# Patient Record
Sex: MALE | URBAN - METROPOLITAN AREA
[De-identification: names, ages, dates, MRNs, and addresses within clinical notes are randomized per-mention and may not be internally consistent; named-entity substitution may affect disease eponyms.]

---

## 2024-10-31 ENCOUNTER — TELEPHONE (OUTPATIENT)
Dept: INTENSIVE CARE | Facility: HOSPITAL | Age: 69
End: 2024-10-31

## 2024-10-31 ENCOUNTER — APPOINTMENT (OUTPATIENT)
Dept: CARDIOLOGY | Facility: HOSPITAL | Age: 69
End: 2024-10-31
Payer: OTHER GOVERNMENT

## 2024-10-31 ENCOUNTER — HOSPITAL ENCOUNTER (INPATIENT)
Facility: HOSPITAL | Age: 69
End: 2024-10-31
Attending: INTERNAL MEDICINE | Admitting: INTERNAL MEDICINE
Payer: OTHER GOVERNMENT

## 2024-10-31 DIAGNOSIS — G89.29 CHRONIC NECK AND BACK PAIN: ICD-10-CM

## 2024-10-31 DIAGNOSIS — I26.99 OTHER PULMONARY EMBOLISM WITHOUT ACUTE COR PULMONALE: ICD-10-CM

## 2024-10-31 DIAGNOSIS — I26.92 ACUTE SADDLE PULMONARY EMBOLISM, UNSPECIFIED WHETHER ACUTE COR PULMONALE PRESENT (MULTI): Primary | ICD-10-CM

## 2024-10-31 DIAGNOSIS — M54.9 CHRONIC NECK AND BACK PAIN: ICD-10-CM

## 2024-10-31 DIAGNOSIS — T83.511A URINARY TRACT INFECTION ASSOCIATED WITH INDWELLING URETHRAL CATHETER, INITIAL ENCOUNTER (CMS-HCC): ICD-10-CM

## 2024-10-31 DIAGNOSIS — E55.9 VITAMIN D DEFICIENCY: ICD-10-CM

## 2024-10-31 DIAGNOSIS — N39.0 URINARY TRACT INFECTION ASSOCIATED WITH INDWELLING URETHRAL CATHETER, INITIAL ENCOUNTER (CMS-HCC): ICD-10-CM

## 2024-10-31 DIAGNOSIS — M54.2 CHRONIC NECK AND BACK PAIN: ICD-10-CM

## 2024-10-31 LAB
ABO GROUP (TYPE) IN BLOOD: NORMAL
ALBUMIN SERPL BCP-MCNC: 3.3 G/DL (ref 3.4–5)
ANION GAP BLDV CALCULATED.4IONS-SCNC: 8 MMOL/L (ref 10–25)
ANION GAP SERPL CALC-SCNC: 12 MMOL/L (ref 10–20)
ANTIBODY SCREEN: NORMAL
APTT PPP: 27 SECONDS (ref 27–38)
BASE EXCESS BLDV CALC-SCNC: 1.3 MMOL/L (ref -2–3)
BASOPHILS # BLD AUTO: 0.05 X10*3/UL (ref 0–0.1)
BASOPHILS NFR BLD AUTO: 0.6 %
BNP SERPL-MCNC: 285 PG/ML (ref 0–99)
BODY TEMPERATURE: 37 DEGREES CELSIUS
BUN SERPL-MCNC: 16 MG/DL (ref 6–23)
CA-I BLDV-SCNC: 1.21 MMOL/L (ref 1.1–1.33)
CALCIUM SERPL-MCNC: 8.7 MG/DL (ref 8.6–10.6)
CHLORIDE BLDV-SCNC: 103 MMOL/L (ref 98–107)
CHLORIDE SERPL-SCNC: 102 MMOL/L (ref 98–107)
CO2 SERPL-SCNC: 26 MMOL/L (ref 21–32)
CREAT SERPL-MCNC: 1.12 MG/DL (ref 0.5–1.3)
EGFRCR SERPLBLD CKD-EPI 2021: 71 ML/MIN/1.73M*2
EOSINOPHIL # BLD AUTO: 0.47 X10*3/UL (ref 0–0.7)
EOSINOPHIL NFR BLD AUTO: 5.3 %
ERYTHROCYTE [DISTWIDTH] IN BLOOD BY AUTOMATED COUNT: 14.5 % (ref 11.5–14.5)
GLUCOSE BLDV-MCNC: 97 MG/DL (ref 74–99)
GLUCOSE SERPL-MCNC: 91 MG/DL (ref 74–99)
HCO3 BLDV-SCNC: 25.9 MMOL/L (ref 22–26)
HCT VFR BLD AUTO: 31.2 % (ref 41–52)
HCT VFR BLD EST: 29 % (ref 41–52)
HGB BLD-MCNC: 10.7 G/DL (ref 13.5–17.5)
HGB BLDV-MCNC: 9.8 G/DL (ref 13.5–17.5)
IMM GRANULOCYTES # BLD AUTO: 0.03 X10*3/UL (ref 0–0.7)
IMM GRANULOCYTES NFR BLD AUTO: 0.3 % (ref 0–0.9)
INHALED O2 CONCENTRATION: 21 %
INR PPP: 1.2 (ref 0.9–1.1)
LACTATE BLDV-SCNC: 1.2 MMOL/L (ref 0.4–2)
LACTATE SERPL-SCNC: 1.2 MMOL/L (ref 0.4–2)
LYMPHOCYTES # BLD AUTO: 2.11 X10*3/UL (ref 1.2–4.8)
LYMPHOCYTES NFR BLD AUTO: 23.6 %
MAGNESIUM SERPL-MCNC: 1.91 MG/DL (ref 1.6–2.4)
MCH RBC QN AUTO: 31 PG (ref 26–34)
MCHC RBC AUTO-ENTMCNC: 34.3 G/DL (ref 32–36)
MCV RBC AUTO: 90 FL (ref 80–100)
MONOCYTES # BLD AUTO: 0.65 X10*3/UL (ref 0.1–1)
MONOCYTES NFR BLD AUTO: 7.3 %
NEUTROPHILS # BLD AUTO: 5.63 X10*3/UL (ref 1.2–7.7)
NEUTROPHILS NFR BLD AUTO: 62.9 %
NRBC BLD-RTO: 0 /100 WBCS (ref 0–0)
OXYHGB MFR BLDV: 32.3 % (ref 45–75)
PCO2 BLDV: 40 MM HG (ref 41–51)
PH BLDV: 7.42 PH (ref 7.33–7.43)
PHOSPHATE SERPL-MCNC: 3.4 MG/DL (ref 2.5–4.9)
PLATELET # BLD AUTO: 275 X10*3/UL (ref 150–450)
PO2 BLDV: 24 MM HG (ref 35–45)
POTASSIUM BLDV-SCNC: 3.9 MMOL/L (ref 3.5–5.3)
POTASSIUM SERPL-SCNC: 4 MMOL/L (ref 3.5–5.3)
PROTHROMBIN TIME: 13.7 SECONDS (ref 9.8–12.8)
RBC # BLD AUTO: 3.45 X10*6/UL (ref 4.5–5.9)
RH FACTOR (ANTIGEN D): NORMAL
SAO2 % BLDV: 33 % (ref 45–75)
SODIUM BLDV-SCNC: 133 MMOL/L (ref 136–145)
SODIUM SERPL-SCNC: 136 MMOL/L (ref 136–145)
WBC # BLD AUTO: 8.9 X10*3/UL (ref 4.4–11.3)

## 2024-10-31 PROCEDURE — 51702 INSERT TEMP BLADDER CATH: CPT

## 2024-10-31 PROCEDURE — 83880 ASSAY OF NATRIURETIC PEPTIDE: CPT

## 2024-10-31 PROCEDURE — 94762 N-INVAS EAR/PLS OXIMTRY CONT: CPT

## 2024-10-31 PROCEDURE — 93010 ELECTROCARDIOGRAM REPORT: CPT | Performed by: INTERNAL MEDICINE

## 2024-10-31 PROCEDURE — 84132 ASSAY OF SERUM POTASSIUM: CPT

## 2024-10-31 PROCEDURE — 2500000002 HC RX 250 W HCPCS SELF ADMINISTERED DRUGS (ALT 637 FOR MEDICARE OP, ALT 636 FOR OP/ED)

## 2024-10-31 PROCEDURE — 84484 ASSAY OF TROPONIN QUANT: CPT

## 2024-10-31 PROCEDURE — 2020000001 HC ICU ROOM DAILY

## 2024-10-31 PROCEDURE — 85025 COMPLETE CBC W/AUTO DIFF WBC: CPT

## 2024-10-31 PROCEDURE — 2500000004 HC RX 250 GENERAL PHARMACY W/ HCPCS (ALT 636 FOR OP/ED)

## 2024-10-31 PROCEDURE — 93005 ELECTROCARDIOGRAM TRACING: CPT

## 2024-10-31 PROCEDURE — 85610 PROTHROMBIN TIME: CPT

## 2024-10-31 PROCEDURE — 83605 ASSAY OF LACTIC ACID: CPT

## 2024-10-31 PROCEDURE — 36415 COLL VENOUS BLD VENIPUNCTURE: CPT

## 2024-10-31 PROCEDURE — 83735 ASSAY OF MAGNESIUM: CPT

## 2024-10-31 PROCEDURE — 86850 RBC ANTIBODY SCREEN: CPT

## 2024-10-31 RX ORDER — METHOCARBAMOL 500 MG/1
1000 TABLET, FILM COATED ORAL 2 TIMES DAILY
COMMUNITY

## 2024-10-31 RX ORDER — OXYCODONE HYDROCHLORIDE 5 MG/1
5 CAPSULE ORAL EVERY 6 HOURS PRN
COMMUNITY

## 2024-10-31 RX ORDER — PANTOPRAZOLE SODIUM 40 MG/1
40 TABLET, DELAYED RELEASE ORAL
COMMUNITY

## 2024-10-31 RX ORDER — ASPIRIN 81 MG/1
81 TABLET ORAL DAILY
COMMUNITY

## 2024-10-31 RX ORDER — QUETIAPINE FUMARATE 25 MG/1
25 TABLET, FILM COATED ORAL ONCE
Status: COMPLETED | OUTPATIENT
Start: 2024-10-31 | End: 2024-10-31

## 2024-10-31 RX ORDER — SENNOSIDES 8.6 MG/1
1 TABLET ORAL DAILY
COMMUNITY

## 2024-10-31 RX ORDER — LAMOTRIGINE 25 MG/1
250 TABLET ORAL 2 TIMES DAILY
COMMUNITY

## 2024-10-31 RX ORDER — AMLODIPINE BESYLATE 10 MG/1
10 TABLET ORAL DAILY
COMMUNITY
End: 2024-11-08 | Stop reason: HOSPADM

## 2024-10-31 RX ORDER — BISACODYL 5 MG
10 TABLET, DELAYED RELEASE (ENTERIC COATED) ORAL NIGHTLY
COMMUNITY

## 2024-10-31 RX ORDER — FUROSEMIDE 20 MG/1
20 TABLET ORAL DAILY
COMMUNITY
End: 2024-11-08 | Stop reason: HOSPADM

## 2024-10-31 RX ORDER — HEPARIN SODIUM 10000 [USP'U]/100ML
INJECTION, SOLUTION INTRAVENOUS
Status: COMPLETED
Start: 2024-10-31 | End: 2024-10-31

## 2024-10-31 RX ORDER — ALLOPURINOL 300 MG/1
300 TABLET ORAL DAILY
COMMUNITY

## 2024-10-31 RX ORDER — GABAPENTIN 600 MG/1
600 TABLET ORAL 3 TIMES DAILY
COMMUNITY

## 2024-10-31 RX ORDER — CYCLOBENZAPRINE HCL 10 MG
10 TABLET ORAL 3 TIMES DAILY
COMMUNITY
End: 2024-11-08 | Stop reason: HOSPADM

## 2024-10-31 RX ORDER — LOSARTAN POTASSIUM 50 MG/1
50 TABLET ORAL DAILY
COMMUNITY
End: 2024-11-08 | Stop reason: HOSPADM

## 2024-10-31 RX ORDER — VENLAFAXINE HYDROCHLORIDE 150 MG/1
150 CAPSULE, EXTENDED RELEASE ORAL DAILY
COMMUNITY

## 2024-10-31 RX ORDER — ATORVASTATIN CALCIUM 40 MG/1
40 TABLET, FILM COATED ORAL DAILY
COMMUNITY

## 2024-10-31 RX ORDER — METFORMIN HYDROCHLORIDE 500 MG/1
750 TABLET ORAL
COMMUNITY

## 2024-10-31 RX ORDER — HEPARIN SODIUM 10000 [USP'U]/100ML
0-4500 INJECTION, SOLUTION INTRAVENOUS CONTINUOUS
Status: DISCONTINUED | OUTPATIENT
Start: 2024-10-31 | End: 2024-11-03

## 2024-10-31 RX ORDER — FENOFIBRATE 48 MG/1
48 TABLET, FILM COATED ORAL DAILY
COMMUNITY

## 2024-10-31 RX ORDER — MELOXICAM 15 MG/1
15 TABLET ORAL DAILY
COMMUNITY
End: 2024-11-08 | Stop reason: HOSPADM

## 2024-10-31 RX ORDER — LANOLIN ALCOHOL/MO/W.PET/CERES
1000 CREAM (GRAM) TOPICAL DAILY
COMMUNITY

## 2024-10-31 RX ORDER — LEVETIRACETAM 1000 MG/1
1250 TABLET ORAL 2 TIMES DAILY
COMMUNITY

## 2024-10-31 RX ORDER — DOCUSATE SODIUM 100 MG/1
100 CAPSULE, LIQUID FILLED ORAL
COMMUNITY

## 2024-10-31 RX ADMIN — HEPARIN SODIUM 2000 UNITS/HR: 10000 INJECTION, SOLUTION INTRAVENOUS at 21:02

## 2024-10-31 RX ADMIN — QUETIAPINE FUMARATE 25 MG: 25 TABLET ORAL at 22:50

## 2024-10-31 ASSESSMENT — PAIN - FUNCTIONAL ASSESSMENT: PAIN_FUNCTIONAL_ASSESSMENT: 0-10

## 2024-10-31 ASSESSMENT — COGNITIVE AND FUNCTIONAL STATUS - GENERAL
TURNING FROM BACK TO SIDE WHILE IN FLAT BAD: A LOT
MOVING TO AND FROM BED TO CHAIR: A LOT
WALKING IN HOSPITAL ROOM: A LOT
DAILY ACTIVITIY SCORE: 12
CLIMB 3 TO 5 STEPS WITH RAILING: A LOT
MOVING FROM LYING ON BACK TO SITTING ON SIDE OF FLAT BED WITH BEDRAILS: A LOT
HELP NEEDED FOR BATHING: A LOT
DRESSING REGULAR LOWER BODY CLOTHING: A LOT
DRESSING REGULAR UPPER BODY CLOTHING: A LOT
PERSONAL GROOMING: A LOT
EATING MEALS: A LOT
TOILETING: A LOT
MOBILITY SCORE: 12
STANDING UP FROM CHAIR USING ARMS: A LOT

## 2024-10-31 ASSESSMENT — PAIN SCALES - GENERAL: PAINLEVEL_OUTOF10: 0 - NO PAIN

## 2024-11-01 ENCOUNTER — APPOINTMENT (OUTPATIENT)
Dept: CARDIOLOGY | Facility: HOSPITAL | Age: 69
End: 2024-11-01
Payer: OTHER GOVERNMENT

## 2024-11-01 ENCOUNTER — APPOINTMENT (OUTPATIENT)
Dept: VASCULAR MEDICINE | Facility: HOSPITAL | Age: 69
End: 2024-11-01
Payer: OTHER GOVERNMENT

## 2024-11-01 LAB
25(OH)D3 SERPL-MCNC: 35 NG/ML (ref 30–100)
ALBUMIN SERPL BCP-MCNC: 3.2 G/DL (ref 3.4–5)
ALBUMIN SERPL BCP-MCNC: 3.4 G/DL (ref 3.4–5)
ALP SERPL-CCNC: 86 U/L (ref 33–136)
ALT SERPL W P-5'-P-CCNC: 9 U/L (ref 10–52)
ANION GAP SERPL CALC-SCNC: 15 MMOL/L (ref 10–20)
ANION GAP SERPL CALC-SCNC: 15 MMOL/L (ref 10–20)
AORTIC VALVE MEAN GRADIENT: 10 MMHG
AORTIC VALVE PEAK VELOCITY: 2.32 M/S
AST SERPL W P-5'-P-CCNC: 16 U/L (ref 9–39)
ATRIAL RATE: 98 BPM
AV PEAK GRADIENT: 22 MMHG
AVA (PEAK VEL): 1.46 CM2
AVA (VTI): 1.62 CM2
BASOPHILS # BLD AUTO: 0.04 X10*3/UL (ref 0–0.1)
BASOPHILS NFR BLD AUTO: 0.4 %
BILIRUB SERPL-MCNC: 0.5 MG/DL (ref 0–1.2)
BUN SERPL-MCNC: 13 MG/DL (ref 6–23)
BUN SERPL-MCNC: 16 MG/DL (ref 6–23)
CALCIUM SERPL-MCNC: 8.8 MG/DL (ref 8.6–10.6)
CALCIUM SERPL-MCNC: 9.1 MG/DL (ref 8.6–10.6)
CARDIAC TROPONIN I PNL SERPL HS: 113 NG/L (ref 0–53)
CHLORIDE SERPL-SCNC: 101 MMOL/L (ref 98–107)
CHLORIDE SERPL-SCNC: 102 MMOL/L (ref 98–107)
CO2 SERPL-SCNC: 23 MMOL/L (ref 21–32)
CO2 SERPL-SCNC: 24 MMOL/L (ref 21–32)
CREAT SERPL-MCNC: 1.1 MG/DL (ref 0.5–1.3)
CREAT SERPL-MCNC: 1.17 MG/DL (ref 0.5–1.3)
EGFRCR SERPLBLD CKD-EPI 2021: 67 ML/MIN/1.73M*2
EGFRCR SERPLBLD CKD-EPI 2021: 72 ML/MIN/1.73M*2
EJECTION FRACTION APICAL 4 CHAMBER: 52.8
EJECTION FRACTION: 55 %
EOSINOPHIL # BLD AUTO: 0.51 X10*3/UL (ref 0–0.7)
EOSINOPHIL NFR BLD AUTO: 5.5 %
ERYTHROCYTE [DISTWIDTH] IN BLOOD BY AUTOMATED COUNT: 14.6 % (ref 11.5–14.5)
GLUCOSE BLD MANUAL STRIP-MCNC: 100 MG/DL (ref 74–99)
GLUCOSE BLD MANUAL STRIP-MCNC: 104 MG/DL (ref 74–99)
GLUCOSE BLD MANUAL STRIP-MCNC: 113 MG/DL (ref 74–99)
GLUCOSE BLD MANUAL STRIP-MCNC: 137 MG/DL (ref 74–99)
GLUCOSE SERPL-MCNC: 84 MG/DL (ref 74–99)
GLUCOSE SERPL-MCNC: 99 MG/DL (ref 74–99)
HCT VFR BLD AUTO: 34.8 % (ref 41–52)
HGB BLD-MCNC: 11.5 G/DL (ref 13.5–17.5)
IMM GRANULOCYTES # BLD AUTO: 0.04 X10*3/UL (ref 0–0.7)
IMM GRANULOCYTES NFR BLD AUTO: 0.4 % (ref 0–0.9)
LEFT ATRIUM VOLUME AREA LENGTH INDEX BSA: 21.7 ML/M2
LEFT VENTRICLE INTERNAL DIMENSION DIASTOLE: 5.35 CM (ref 3.5–6)
LEFT VENTRICULAR OUTFLOW TRACT DIAMETER: 2.1 CM
LYMPHOCYTES # BLD AUTO: 2.65 X10*3/UL (ref 1.2–4.8)
LYMPHOCYTES NFR BLD AUTO: 28.5 %
MAGNESIUM SERPL-MCNC: 2 MG/DL (ref 1.6–2.4)
MCH RBC QN AUTO: 30.6 PG (ref 26–34)
MCHC RBC AUTO-ENTMCNC: 33 G/DL (ref 32–36)
MCV RBC AUTO: 93 FL (ref 80–100)
MITRAL VALVE E/A RATIO: 0.61
MONOCYTES # BLD AUTO: 0.66 X10*3/UL (ref 0.1–1)
MONOCYTES NFR BLD AUTO: 7.1 %
NEUTROPHILS # BLD AUTO: 5.39 X10*3/UL (ref 1.2–7.7)
NEUTROPHILS NFR BLD AUTO: 58.1 %
NRBC BLD-RTO: 0 /100 WBCS (ref 0–0)
P AXIS: 36 DEGREES
P OFFSET: 197 MS
P ONSET: 136 MS
PHOSPHATE SERPL-MCNC: 3.4 MG/DL (ref 2.5–4.9)
PHOSPHATE SERPL-MCNC: 4.1 MG/DL (ref 2.5–4.9)
PLATELET # BLD AUTO: 264 X10*3/UL (ref 150–450)
POTASSIUM SERPL-SCNC: 4.1 MMOL/L (ref 3.5–5.3)
POTASSIUM SERPL-SCNC: 4.2 MMOL/L (ref 3.5–5.3)
PR INTERVAL: 174 MS
PROT SERPL-MCNC: 6.5 G/DL (ref 6.4–8.2)
Q ONSET: 223 MS
QRS COUNT: 16 BEATS
QRS DURATION: 112 MS
QT INTERVAL: 368 MS
QTC CALCULATION(BAZETT): 469 MS
QTC FREDERICIA: 433 MS
R AXIS: 59 DEGREES
RBC # BLD AUTO: 3.76 X10*6/UL (ref 4.5–5.9)
RIGHT VENTRICLE FREE WALL PEAK S': 12.7 CM/S
RIGHT VENTRICLE PEAK SYSTOLIC PRESSURE: 45.5 MMHG
SODIUM SERPL-SCNC: 136 MMOL/L (ref 136–145)
SODIUM SERPL-SCNC: 136 MMOL/L (ref 136–145)
T AXIS: 89 DEGREES
T OFFSET: 407 MS
TRICUSPID ANNULAR PLANE SYSTOLIC EXCURSION: 2.2 CM
UFH PPP CHRO-ACNC: 0.2 IU/ML
UFH PPP CHRO-ACNC: 0.4 IU/ML
UFH PPP CHRO-ACNC: 0.4 IU/ML
VENTRICULAR RATE: 98 BPM
WBC # BLD AUTO: 9.3 X10*3/UL (ref 4.4–11.3)

## 2024-11-01 PROCEDURE — 99291 CRITICAL CARE FIRST HOUR: CPT

## 2024-11-01 PROCEDURE — 2500000005 HC RX 250 GENERAL PHARMACY W/O HCPCS

## 2024-11-01 PROCEDURE — 82947 ASSAY GLUCOSE BLOOD QUANT: CPT

## 2024-11-01 PROCEDURE — 1200000002 HC GENERAL ROOM WITH TELEMETRY DAILY

## 2024-11-01 PROCEDURE — 80069 RENAL FUNCTION PANEL: CPT | Mod: CCI

## 2024-11-01 PROCEDURE — 2500000004 HC RX 250 GENERAL PHARMACY W/ HCPCS (ALT 636 FOR OP/ED)

## 2024-11-01 PROCEDURE — 82306 VITAMIN D 25 HYDROXY: CPT

## 2024-11-01 PROCEDURE — 80053 COMPREHEN METABOLIC PANEL: CPT

## 2024-11-01 PROCEDURE — 93306 TTE W/DOPPLER COMPLETE: CPT

## 2024-11-01 PROCEDURE — 36415 COLL VENOUS BLD VENIPUNCTURE: CPT

## 2024-11-01 PROCEDURE — 2500000001 HC RX 250 WO HCPCS SELF ADMINISTERED DRUGS (ALT 637 FOR MEDICARE OP)

## 2024-11-01 PROCEDURE — 84100 ASSAY OF PHOSPHORUS: CPT

## 2024-11-01 PROCEDURE — 85025 COMPLETE CBC W/AUTO DIFF WBC: CPT

## 2024-11-01 PROCEDURE — 85520 HEPARIN ASSAY: CPT

## 2024-11-01 PROCEDURE — 2500000002 HC RX 250 W HCPCS SELF ADMINISTERED DRUGS (ALT 637 FOR MEDICARE OP, ALT 636 FOR OP/ED)

## 2024-11-01 PROCEDURE — 93306 TTE W/DOPPLER COMPLETE: CPT | Performed by: STUDENT IN AN ORGANIZED HEALTH CARE EDUCATION/TRAINING PROGRAM

## 2024-11-01 PROCEDURE — 83735 ASSAY OF MAGNESIUM: CPT

## 2024-11-01 RX ORDER — LANOLIN ALCOHOL/MO/W.PET/CERES
1000 CREAM (GRAM) TOPICAL DAILY
Status: DISCONTINUED | OUTPATIENT
Start: 2024-11-01 | End: 2024-11-08 | Stop reason: HOSPADM

## 2024-11-01 RX ORDER — CHOLECALCIFEROL (VITAMIN D3) 25 MCG
1000 TABLET ORAL DAILY
Status: DISCONTINUED | OUTPATIENT
Start: 2024-11-01 | End: 2024-11-08 | Stop reason: HOSPADM

## 2024-11-01 RX ORDER — SENNOSIDES 8.6 MG/1
2 TABLET ORAL 2 TIMES DAILY
Status: DISCONTINUED | OUTPATIENT
Start: 2024-11-01 | End: 2024-11-08 | Stop reason: HOSPADM

## 2024-11-01 RX ORDER — PANTOPRAZOLE SODIUM 40 MG/1
40 TABLET, DELAYED RELEASE ORAL
Status: DISCONTINUED | OUTPATIENT
Start: 2024-11-01 | End: 2024-11-08 | Stop reason: HOSPADM

## 2024-11-01 RX ORDER — ASPIRIN 81 MG/1
81 TABLET ORAL DAILY
Status: DISCONTINUED | OUTPATIENT
Start: 2024-11-01 | End: 2024-11-08 | Stop reason: HOSPADM

## 2024-11-01 RX ORDER — LEVETIRACETAM 500 MG/1
1250 TABLET ORAL 2 TIMES DAILY
Status: DISCONTINUED | OUTPATIENT
Start: 2024-11-01 | End: 2024-11-08 | Stop reason: HOSPADM

## 2024-11-01 RX ORDER — ATORVASTATIN CALCIUM 40 MG/1
40 TABLET, FILM COATED ORAL DAILY
Status: DISCONTINUED | OUTPATIENT
Start: 2024-11-01 | End: 2024-11-08 | Stop reason: HOSPADM

## 2024-11-01 RX ORDER — FENOFIBRATE 54 MG/1
54 TABLET ORAL DAILY
Status: DISCONTINUED | OUTPATIENT
Start: 2024-11-01 | End: 2024-11-08 | Stop reason: HOSPADM

## 2024-11-01 RX ORDER — AMLODIPINE BESYLATE 10 MG/1
10 TABLET ORAL DAILY
Status: DISCONTINUED | OUTPATIENT
Start: 2024-11-01 | End: 2024-11-02

## 2024-11-01 RX ORDER — CEFTRIAXONE 1 G/50ML
1 INJECTION, SOLUTION INTRAVENOUS EVERY 24 HOURS
Status: DISCONTINUED | OUTPATIENT
Start: 2024-11-01 | End: 2024-11-03

## 2024-11-01 RX ORDER — INSULIN LISPRO 100 [IU]/ML
0-5 INJECTION, SOLUTION INTRAVENOUS; SUBCUTANEOUS
Status: DISCONTINUED | OUTPATIENT
Start: 2024-11-01 | End: 2024-11-08 | Stop reason: HOSPADM

## 2024-11-01 RX ORDER — QUETIAPINE FUMARATE 25 MG/1
25 TABLET, FILM COATED ORAL ONCE
Status: COMPLETED | OUTPATIENT
Start: 2024-11-01 | End: 2024-11-01

## 2024-11-01 RX ORDER — BISACODYL 5 MG
10 TABLET, DELAYED RELEASE (ENTERIC COATED) ORAL
Status: DISCONTINUED | OUTPATIENT
Start: 2024-11-05 | End: 2024-11-08 | Stop reason: HOSPADM

## 2024-11-01 RX ORDER — LIDOCAINE 560 MG/1
3 PATCH PERCUTANEOUS; TOPICAL; TRANSDERMAL DAILY
Status: DISCONTINUED | OUTPATIENT
Start: 2024-11-01 | End: 2024-11-08 | Stop reason: HOSPADM

## 2024-11-01 RX ORDER — GABAPENTIN 600 MG/1
600 TABLET ORAL 3 TIMES DAILY
Status: DISCONTINUED | OUTPATIENT
Start: 2024-11-01 | End: 2024-11-01

## 2024-11-01 RX ORDER — DEXTROSE 50 % IN WATER (D50W) INTRAVENOUS SYRINGE
25
Status: DISCONTINUED | OUTPATIENT
Start: 2024-11-01 | End: 2024-11-08 | Stop reason: HOSPADM

## 2024-11-01 RX ORDER — DEXTROSE 50 % IN WATER (D50W) INTRAVENOUS SYRINGE
12.5
Status: DISCONTINUED | OUTPATIENT
Start: 2024-11-01 | End: 2024-11-08 | Stop reason: HOSPADM

## 2024-11-01 RX ADMIN — ATORVASTATIN CALCIUM 40 MG: 40 TABLET, FILM COATED ORAL at 10:01

## 2024-11-01 RX ADMIN — QUETIAPINE FUMARATE 25 MG: 25 TABLET ORAL at 03:16

## 2024-11-01 RX ADMIN — LAMOTRIGINE 250 MG: 100 TABLET ORAL at 03:16

## 2024-11-01 RX ADMIN — PERFLUTREN 3 ML OF DILUTION: 6.52 INJECTION, SUSPENSION INTRAVENOUS at 11:22

## 2024-11-01 RX ADMIN — Medication 1000 UNITS: at 16:51

## 2024-11-01 RX ADMIN — HEPARIN SODIUM 2200 UNITS/HR: 10000 INJECTION, SOLUTION INTRAVENOUS at 20:47

## 2024-11-01 RX ADMIN — STANDARDIZED SENNA CONCENTRATE 17.2 MG: 8.6 TABLET ORAL at 10:12

## 2024-11-01 RX ADMIN — AMLODIPINE BESYLATE 10 MG: 10 TABLET ORAL at 15:42

## 2024-11-01 RX ADMIN — FENOFIBRATE 54 MG: 54 TABLET, FILM COATED ORAL at 10:14

## 2024-11-01 RX ADMIN — PANTOPRAZOLE SODIUM 40 MG: 40 TABLET, DELAYED RELEASE ORAL at 10:20

## 2024-11-01 RX ADMIN — LEVETIRACETAM 1250 MG: 500 TABLET, FILM COATED ORAL at 01:51

## 2024-11-01 RX ADMIN — LAMOTRIGINE 250 MG: 100 TABLET ORAL at 20:47

## 2024-11-01 RX ADMIN — HEPARIN SODIUM 2200 UNITS/HR: 10000 INJECTION, SOLUTION INTRAVENOUS at 07:53

## 2024-11-01 RX ADMIN — LIDOCAINE 3 PATCH: 4 PATCH TOPICAL at 17:27

## 2024-11-01 RX ADMIN — STANDARDIZED SENNA CONCENTRATE 17.2 MG: 8.6 TABLET ORAL at 01:52

## 2024-11-01 RX ADMIN — LAMOTRIGINE 250 MG: 100 TABLET ORAL at 10:13

## 2024-11-01 RX ADMIN — LEVETIRACETAM 1250 MG: 500 TABLET, FILM COATED ORAL at 10:02

## 2024-11-01 RX ADMIN — ASPIRIN 81 MG: 81 TABLET, COATED ORAL at 10:01

## 2024-11-01 RX ADMIN — CEFTRIAXONE SODIUM 1 G: 1 INJECTION, SOLUTION INTRAVENOUS at 03:25

## 2024-11-01 RX ADMIN — CYANOCOBALAMIN TAB 1000 MCG 1000 MCG: 1000 TAB at 10:14

## 2024-11-01 RX ADMIN — LEVETIRACETAM 1250 MG: 500 TABLET, FILM COATED ORAL at 20:47

## 2024-11-01 RX ADMIN — GABAPENTIN 600 MG: 600 TABLET, FILM COATED ORAL at 10:01

## 2024-11-01 SDOH — ECONOMIC STABILITY: FOOD INSECURITY: WITHIN THE PAST 12 MONTHS, THE FOOD YOU BOUGHT JUST DIDN'T LAST AND YOU DIDN'T HAVE MONEY TO GET MORE.: NEVER TRUE

## 2024-11-01 SDOH — ECONOMIC STABILITY: FOOD INSECURITY: WITHIN THE PAST 12 MONTHS, YOU WORRIED THAT YOUR FOOD WOULD RUN OUT BEFORE YOU GOT THE MONEY TO BUY MORE.: NEVER TRUE

## 2024-11-01 SDOH — ECONOMIC STABILITY: INCOME INSECURITY: IN THE PAST 12 MONTHS HAS THE ELECTRIC, GAS, OIL, OR WATER COMPANY THREATENED TO SHUT OFF SERVICES IN YOUR HOME?: NO

## 2024-11-01 SDOH — ECONOMIC STABILITY: FOOD INSECURITY: HOW HARD IS IT FOR YOU TO PAY FOR THE VERY BASICS LIKE FOOD, HOUSING, MEDICAL CARE, AND HEATING?: NOT VERY HARD

## 2024-11-01 SDOH — SOCIAL STABILITY: SOCIAL INSECURITY: ARE THERE ANY APPARENT SIGNS OF INJURIES/BEHAVIORS THAT COULD BE RELATED TO ABUSE/NEGLECT?: NO

## 2024-11-01 SDOH — SOCIAL STABILITY: SOCIAL INSECURITY: WITHIN THE LAST YEAR, HAVE YOU BEEN AFRAID OF YOUR PARTNER OR EX-PARTNER?: NO

## 2024-11-01 SDOH — ECONOMIC STABILITY: HOUSING INSECURITY: IN THE PAST 12 MONTHS, HOW MANY TIMES HAVE YOU MOVED WHERE YOU WERE LIVING?: 0

## 2024-11-01 SDOH — SOCIAL STABILITY: SOCIAL INSECURITY
WITHIN THE LAST YEAR, HAVE YOU BEEN RAPED OR FORCED TO HAVE ANY KIND OF SEXUAL ACTIVITY BY YOUR PARTNER OR EX-PARTNER?: NO

## 2024-11-01 SDOH — SOCIAL STABILITY: SOCIAL INSECURITY
WITHIN THE LAST YEAR, HAVE YOU BEEN KICKED, HIT, SLAPPED, OR OTHERWISE PHYSICALLY HURT BY YOUR PARTNER OR EX-PARTNER?: NO

## 2024-11-01 SDOH — ECONOMIC STABILITY: HOUSING INSECURITY: IN THE LAST 12 MONTHS, WAS THERE A TIME WHEN YOU WERE NOT ABLE TO PAY THE MORTGAGE OR RENT ON TIME?: NO

## 2024-11-01 SDOH — SOCIAL STABILITY: SOCIAL INSECURITY: HAVE YOU HAD THOUGHTS OF HARMING ANYONE ELSE?: YES

## 2024-11-01 SDOH — ECONOMIC STABILITY: HOUSING INSECURITY: AT ANY TIME IN THE PAST 12 MONTHS, WERE YOU HOMELESS OR LIVING IN A SHELTER (INCLUDING NOW)?: NO

## 2024-11-01 SDOH — SOCIAL STABILITY: SOCIAL INSECURITY: HAS ANYONE EVER THREATENED TO HURT YOUR FAMILY OR YOUR PETS?: NO

## 2024-11-01 SDOH — SOCIAL STABILITY: SOCIAL INSECURITY: WITHIN THE LAST YEAR, HAVE YOU BEEN HUMILIATED OR EMOTIONALLY ABUSED IN OTHER WAYS BY YOUR PARTNER OR EX-PARTNER?: NO

## 2024-11-01 SDOH — SOCIAL STABILITY: SOCIAL INSECURITY: WERE YOU ABLE TO COMPLETE ALL THE BEHAVIORAL HEALTH SCREENINGS?: YES

## 2024-11-01 SDOH — ECONOMIC STABILITY: TRANSPORTATION INSECURITY: IN THE PAST 12 MONTHS, HAS LACK OF TRANSPORTATION KEPT YOU FROM MEDICAL APPOINTMENTS OR FROM GETTING MEDICATIONS?: NO

## 2024-11-01 SDOH — ECONOMIC STABILITY: HOUSING INSECURITY: IN THE PAST 12 MONTHS, HOW MANY TIMES HAVE YOU MOVED WHERE YOU WERE LIVING?: 1

## 2024-11-01 SDOH — SOCIAL STABILITY: SOCIAL INSECURITY: DO YOU FEEL UNSAFE GOING BACK TO THE PLACE WHERE YOU ARE LIVING?: NO

## 2024-11-01 SDOH — SOCIAL STABILITY: SOCIAL INSECURITY: ARE YOU OR HAVE YOU BEEN THREATENED OR ABUSED PHYSICALLY, EMOTIONALLY, OR SEXUALLY BY ANYONE?: NO

## 2024-11-01 SDOH — SOCIAL STABILITY: SOCIAL INSECURITY: DO YOU FEEL ANYONE HAS EXPLOITED OR TAKEN ADVANTAGE OF YOU FINANCIALLY OR OF YOUR PERSONAL PROPERTY?: NO

## 2024-11-01 SDOH — SOCIAL STABILITY: SOCIAL INSECURITY: HAVE YOU HAD ANY THOUGHTS OF HARMING ANYONE ELSE?: NO

## 2024-11-01 SDOH — SOCIAL STABILITY: SOCIAL INSECURITY: ABUSE: ADULT

## 2024-11-01 SDOH — ECONOMIC STABILITY: FOOD INSECURITY: HOW HARD IS IT FOR YOU TO PAY FOR THE VERY BASICS LIKE FOOD, HOUSING, MEDICAL CARE, AND HEATING?: NOT HARD AT ALL

## 2024-11-01 SDOH — SOCIAL STABILITY: SOCIAL INSECURITY: DOES ANYONE TRY TO KEEP YOU FROM HAVING/CONTACTING OTHER FRIENDS OR DOING THINGS OUTSIDE YOUR HOME?: NO

## 2024-11-01 ASSESSMENT — PAIN SCALES - GENERAL
PAINLEVEL_OUTOF10: 0 - NO PAIN
PAINLEVEL_OUTOF10: 6
PAINLEVEL_OUTOF10: 4

## 2024-11-01 ASSESSMENT — ACTIVITIES OF DAILY LIVING (ADL)
LACK_OF_TRANSPORTATION: NO
PATIENT'S MEMORY ADEQUATE TO SAFELY COMPLETE DAILY ACTIVITIES?: NO
ADEQUATE_TO_COMPLETE_ADL: YES
LACK_OF_TRANSPORTATION: NO
TOILETING: NEEDS ASSISTANCE
BATHING: NEEDS ASSISTANCE
GROOMING: INDEPENDENT
LACK_OF_TRANSPORTATION: NO
FEEDING YOURSELF: NEEDS ASSISTANCE
DRESSING YOURSELF: INDEPENDENT
HEARING - RIGHT EAR: FUNCTIONAL
WALKS IN HOME: NEEDS ASSISTANCE
LACK_OF_TRANSPORTATION: NO
HEARING - LEFT EAR: FUNCTIONAL
JUDGMENT_ADEQUATE_SAFELY_COMPLETE_DAILY_ACTIVITIES: YES

## 2024-11-01 ASSESSMENT — COLUMBIA-SUICIDE SEVERITY RATING SCALE - C-SSRS
6. HAVE YOU EVER DONE ANYTHING, STARTED TO DO ANYTHING, OR PREPARED TO DO ANYTHING TO END YOUR LIFE?: NO
1. IN THE PAST MONTH, HAVE YOU WISHED YOU WERE DEAD OR WISHED YOU COULD GO TO SLEEP AND NOT WAKE UP?: NO
2. HAVE YOU ACTUALLY HAD ANY THOUGHTS OF KILLING YOURSELF?: NO

## 2024-11-01 ASSESSMENT — LIFESTYLE VARIABLES
AUDIT-C TOTAL SCORE: 0
HOW MANY STANDARD DRINKS CONTAINING ALCOHOL DO YOU HAVE ON A TYPICAL DAY: PATIENT DOES NOT DRINK
AUDIT-C TOTAL SCORE: 0
PRESCIPTION_ABUSE_PAST_12_MONTHS: NO
SKIP TO QUESTIONS 9-10: 1
HOW OFTEN DO YOU HAVE 6 OR MORE DRINKS ON ONE OCCASION: NEVER
HOW OFTEN DO YOU HAVE A DRINK CONTAINING ALCOHOL: NEVER
SUBSTANCE_ABUSE_PAST_12_MONTHS: NO

## 2024-11-01 ASSESSMENT — PAIN SCALES - PAIN ASSESSMENT IN ADVANCED DEMENTIA (PAINAD): TOTALSCORE: MEDICATION (SEE MAR)

## 2024-11-01 ASSESSMENT — PAIN - FUNCTIONAL ASSESSMENT
PAIN_FUNCTIONAL_ASSESSMENT: 0-10

## 2024-11-01 ASSESSMENT — PAIN DESCRIPTION - ORIENTATION: ORIENTATION: LOWER

## 2024-11-01 ASSESSMENT — COGNITIVE AND FUNCTIONAL STATUS - GENERAL: PATIENT BASELINE BEDBOUND: YES

## 2024-11-01 ASSESSMENT — PAIN DESCRIPTION - LOCATION: LOCATION: BACK

## 2024-11-01 ASSESSMENT — PATIENT HEALTH QUESTIONNAIRE - PHQ9
2. FEELING DOWN, DEPRESSED OR HOPELESS: NOT AT ALL
1. LITTLE INTEREST OR PLEASURE IN DOING THINGS: NOT AT ALL
SUM OF ALL RESPONSES TO PHQ9 QUESTIONS 1 & 2: 0
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
## 2024-11-01 NOTE — H&P
"Cardiovascular Intensive Care - History and Physical   Subjective    Nino Crocker is a 69 y.o. year old male patient admitted on 10/31/2024 with following Cardiovascular ICU needs:  Saddle PE with concern for hemodynamic instability requiring close ICU monitoring    HPI:  Nino Crocker is a 69 y.o. year old male patient with PMHx significant for psoriatic arthritis (on infliximab), CVA (2024), HTN, seizure disorder, hx NSTEMI, and recent traumatic T6-T7 fracture (after a fall 2024) resulting in 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 bladder and bowel, presenting from [facility] ([place] with altered mental status.     History obtained from chart review and spouse. Wife stated that pt fell out of his wheelchair and hit his low back and head on 10/21, no LOC. Was sent to  ED, CTH negative. Since then, pt has become progressively more confused. Is hard of hearing but has been unable to comprehend simple conversations and unable to recall events of the recent past. She reports visual hallucinations. Patient denied recent f/c, SOB, CP, cough, n/v.  Did endorse occasional pleuritic CP in his left chest. Wife reports concern of pt not receiving all his medications at [facility]. Of note, pt was planned for admission to [facility] on  for elective colostomy and suprapubic catheter placement to treat neurogenic bowel/bladder.    [facility] - [facility] COURSE  ED course:   In ED, pt. was sleeping upon evaluation. He was AO x2 (oriented  to person/time, answered \"yes\" when asked if we were in a [employer]). Pt very poor historian but denied any acute complaints; no rhinorrhea, cough, n/v, chest pain, SOB. Denied pain anywhere. Had no sensation below umbilicus.    VS: Temp 98.2F, , Resp 18, /69, SpO2 96% RA     Labs  - CBC: WBC 10.9, Hb 11.6,    - BMP: Na 135, K 4.2, Cl 102, HCO3 21(L), BUN 18/Cr 1.5(H, bl. 0.8), Mg 1.8, Ca 9.1, Gluc 110  - LFTs: WNL  - TSH/fT4: WNL   - VB.51/29/36/LA 1.7   - UA " positive for: pH 5.5, 50 protein, 9 RBC, > 182 WBC, 500 LE, +nitrite, 4+ bacteria   - UDS: +cannabinoids   - Trop 141-->157-->128  -   - Lactate 1.7    EKG: sinus tachycardia (102), slightly wide QRS with RBBB pattern, new CHENCHO V1 (0.5mm) and new CHENCHO V2 (1mm)    Imaging:  CTH: negative for acute intracranial pathology (official report pending)  CXR: no acute CP process, no changes from prior 09/2024 (official report pending)  POCUS done by ED physician: preserved EF, dilated RV, +tSewart's sign, small/collapsible IVC    Interventions: 2L LR     Hospital Course:  Heparin gtt was started empirically after high suspicion for PE given bedside POCUS, elevated trop, BNP, right heart strain on EKG. CT PE showed saddle PE and large plot burden. Formal Echo obtained (pending read). Had remained hemodynamically stable on RA. Got 2L of NS in ED but otherwise has not needed pressors. Mentation still off . PESI score calculated was at 190 and would place him in intermediate high risk category. PERT call was done and patient was accepted to  CICU    On arrival to  CICU:  Patient disorented, spoke with wife and son at bedside. No active dyspnea. Still endorsing intermittent chest tenderness, pleuritic in nature.     PAST MEDICAL HISTORY  psoriatic arthritis (on infliximab), CVA (07/2024), HTN, seizure disorder, hx NSTEMI, and recent traumatic T6-T7 fracture (after  a fall 07/2024) resulting in 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 bladder and bowel     PAST SURGICAL HISTORY  Non-contributory     MEDICATIONS FROM FACILITY ([facility])  - Allopurinol 300 mg daily  - Lidocaine 4% patch apply on left chest, right chest, right lumbar area daily  - ASA 81 mg daily  - Atorvastatin 40 mg daily  - Bisacodyl suppository 10 mg qHS on TTS  - Cholecalciferol 1000 units daily  - Losartan 50 mg daily  - Cyclobenzaprine 10 mg TID  - Docusate 100 mg q24h PRN constipation  - Furosemide 20 mg daily  -  Levetiracetam 1250 mg BID  - Lactobacillus 1 tablet daily  - Lamotrigine 250 mg BID  - Meloxicam 15 mg daily  - Metformin 750 mg BID  - Methocarbamol 1000 mg BID  - Gabapentin 600 mg TID  - Amlodipine 10 mg daily (hold if SBP < 100)  - Oxycodone 5 mg q6h PRN moderate/severe pain  - Pantoprazole 40 mg daily  - Sennosides 8.6 mg daily  - Fenofibrate 48 mg daily  - Venlafaxine  mg daily  - Vitamin B12 1000 mcg daily  - Diclofenac 1% topical apply to anterior thorax, left shoulder, left hand, right hand q6h PRN pain    SOCIAL HISTORY  - Living situation: Lives at [facility] ([place])  - Tobacco: quit 1984  - Alcohol: denies  - Drugs: cannabis occasionally for pain      Meds    Home medications:  Current Outpatient Medications   Medication Instructions   • allopurinol (ZYLOPRIM) 300 mg, Daily   • amLODIPine (NORVASC) 10 mg, Daily   • aspirin 81 mg, Daily   • atorvastatin (LIPITOR) 40 mg, Daily   • bisacodyl (DULCOLAX) 10 mg, Nightly   • cyanocobalamin (VITAMIN B-12) 1,000 mcg, Daily   • cyclobenzaprine (FLEXERIL) 10 mg, 3 times daily   • docusate sodium (COLACE) 100 mg, Every 24 hours PRN   • fenofibrate (TRICOR) 48 mg, Daily   • furosemide (Lasix) 20 mg tablet Take by mouth.   • gabapentin (NEURONTIN) 600 mg, 3 times daily   • lamoTRIgine (LAMICTAL) 250 mg, 2 times daily   • levETIRAcetam (KEPPRA) 1,250 mg, 2 times daily   • losartan (COZAAR) 50 mg, Daily   • meloxicam (MOBIC) 15 mg, Daily   • metFORMIN (GLUCOPHAGE) 750 mg, 2 times daily (morning and late afternoon)   • methocarbamol (ROBAXIN) 1,000 mg, 2 times daily   • oxyCODONE (OXY-IR) 5 mg, Every 6 hours PRN   • pantoprazole (PROTONIX) 40 mg, Daily before breakfast   • sennosides (Senokot) 8.6 mg tablet 1 tablet, Daily   • venlafaxine XR (EFFEXOR-XR) 150 mg, Daily        Inpatient medications:  Scheduled medications  aspirin, 81 mg, oral, Daily  atorvastatin, 40 mg, oral, Daily  [START ON 11/5/2024] bisacodyl, 10 mg, oral, Every  Tues/Thur  cefTRIAXone, 1 g, intravenous, q24h  cyanocobalamin, 1,000 mcg, oral, Daily  fenofibrate, 54 mg, oral, Daily  gabapentin, 600 mg, oral, TID  insulin lispro, 0-5 Units, subcutaneous, TID  lamoTRIgine, 250 mg, oral, BID  levETIRAcetam, 1,250 mg, oral, BID  pantoprazole, 40 mg, oral, Daily before breakfast  sennosides, 2 tablet, oral, BID      Continuous medications  heparin, 0-4,500 Units/hr, Last Rate: 2,200 Units/hr (11/01/24 0300)      PRN medications  PRN medications: dextrose, dextrose, glucagon, glucagon, heparin     Objective    Blood pressure (!) 129/106, pulse 94, resp. rate 19, SpO2 90%.     Physical Exam  Constitutional:       General: He is not in acute distress.     Appearance: Normal appearance. He is normal weight.   HENT:      Head: Normocephalic and atraumatic.      Nose: Nose normal.   Eyes:      General: No scleral icterus.     Extraocular Movements: Extraocular movements intact.   Cardiovascular:      Rate and Rhythm: Normal rate and regular rhythm.      Heart sounds: Normal heart sounds. No murmur heard.     No friction rub. No gallop.   Pulmonary:      Effort: Pulmonary effort is normal. No respiratory distress.      Breath sounds: Normal breath sounds. No wheezing or rales.   Chest:      Chest wall: No tenderness.   Abdominal:      General: There is no distension.      Palpations: Abdomen is soft.      Tenderness: There is no abdominal tenderness.   Musculoskeletal:         General: Normal range of motion.      Cervical back: Normal range of motion and neck supple.      Right lower leg: No edema.      Left lower leg: No edema.   Skin:     General: Skin is warm and dry.   Neurological:      Mental Status: He is alert. Mental status is at baseline. He is disoriented.      Sensory: Sensory deficit present.      Motor: Weakness present.   Psychiatric:      Comments: Intermittently agitated          Images:    CT PE 10/31 at 1000: Extensive bilateral pulmonary embolism.  Requested images  be pushed to PACS     CXR: 10/31: No acute cardiopulmonary process or suspicious findings.      CTH 10/31: No acute or suspicious findings to account for patient's symptoms. Mild age consistent chronic changes.       EKG: S1Q3, flatted t3, sinus tachy, t wave inversions in V1-3, non specific St depressions 1 and AVL     Echo 10/31/2024  Left Ventricle:   Left ventricular size and systolic function is normal. The Ejection Fraction estimate is 60-65%. There is concentric remodeling of the left ventricle.     Right Ventricle:   The right ventricle is not well visualized. The right ventricle is grossly normal size. The right ventricular systolic function is moderately reduced. On subcostal view, there  appears to be significant hypokinesis of the RV free wall with apical sparing.     Left Atrium:   The left atrial size is normal.     Right Atrium:   The right atrium is borderline dilated.     Mitral Valve:   The mitral valve leaflets appear thickened, but open well. There is no mitral regurgitation noted.     Tricuspid Valve:   The tricuspid valve is grossly normal. There is mild to moderate tricuspid regurgitation. Right ventricular systolic pressure is'43'mm Hg. There is mild pulmonary hypertension.     Aortic Valve:   The aortic valve is trileaflet. The aortic valve appears mildly calcified. The Aortic Valve leaftlets appear mildly thickened. There is mild aortic stenosis. The aortic valve peak  velocity is 230 cm/s. The mean pressure gradient is 10mmHg. DI is 0.58. Mild aortic regurgitation.     Pulmonic Valve:   The pulmonic valve is not well visualized. Mild pulmonic valvular regurgitation.     Great Vessels:   The aortic root is normal size. The inferior vena cava appeared normal.     Pericardium/Pleural Effusions:   There is a Trivial pericardial effusion.     Diastology:   A variety of Doppler measurements indicate normal left ventricular diastolic function.     Interpretation Summary  Left ventricular size and  systolic function is normal.  The Ejection Fraction estimate is 60-65%  The right ventricle is grossly normal size.  The right ventricular systolic function is moderately reduced.  RV was only fairly well visualized on subcostal views and there appears to be significant hypokinesis of the RV free wall with apical sparing.  The right atrium is borderline dilated.  There is mild to moderate tricuspid regurgitation.  There is mild pulmonary hypertension  A variety of Doppler measurements indicate normal left ventricular diastolic function.    Echo 9/13/2024:   Left Ventricle:   The left ventricle is normal in size. Left ventricular systolic function is normal. The Ejection Fraction estimate is 60-65%. No regional wall motion abnormalities noted. Proximal  septal thickening is noted. There is concentric remodeling of the left ventricle.     Right Ventricle:   The right ventricle is normal in size and function.     Left Atrium:   The left atrial size is normal.     Right Atrium:   Right atrial size is normal.     Mitral Valve:   The mitral valve is grossly normal. No significant mitral valve stenosis. There is trace mitral regurgitation.     Tricuspid Valve:   The tricuspid valve is grossly normal. No significant tricuspid stenosis. There is trace tricuspid regurgitation. Right ventricular systolic pressure cannot be determined.     Aortic Valve:   The aortic valve is trileaflet. The aortic valve opens well. There is mild aortic stenosis. Mild aortic regurgitation.     Pulmonic Valve:   The pulmonic valve is not well seen, but is grossly normal. There is no pulmonic valvular stenosis. Trace pulmonic valvular regurgitation.     Great Vessels:   The aortic root is normal size. The inferior vena cava appeared normal.     Pericardium/Pleural Effusions:   There is a Trivial pericardial effusion.     Diastology:   A variety of Doppler measurements indicate normal left ventricular diastolic function.     Atrial Septum:   There  is no Doppler evidence for an atrial septal defect.     Interpretation Summary  The right ventricle is normal in size and function.  There is mild aortic stenosis  Mild aortic regurgitation.  A variety of Doppler measurements indicate normal left ventricular diastolic function.      Labs:     Latest Reference Range & Units 10/31/24 19:11 10/31/24 19:21 11/01/24 00:04 11/01/24 00:05   ANTIBODY SCREEN  NEG      ABO TYPE  A      Rh Type  POS      GLUCOSE 74 - 99 mg/dL 91  84    SODIUM 136 - 145 mmol/L 136  136    POTASSIUM 3.5 - 5.3 mmol/L 4.0  4.2    CHLORIDE 98 - 107 mmol/L 102  101    Bicarbonate 21 - 32 mmol/L 26  24    Anion Gap 10 - 20 mmol/L 12  15    Blood Urea Nitrogen 6 - 23 mg/dL 16  16    Creatinine 0.50 - 1.30 mg/dL 1.12  1.17    EGFR >60 mL/min/1.73m*2 71  67    Calcium 8.6 - 10.6 mg/dL 8.7  9.1    PHOSPHORUS 2.5 - 4.9 mg/dL 3.4   3.4   Albumin 3.4 - 5.0 g/dL 3.3 (L)  3.4    Alkaline Phosphatase 33 - 136 U/L   86    ALT 10 - 52 U/L   9 (L)    AST 9 - 39 U/L   16    Bilirubin Total 0.0 - 1.2 mg/dL   0.5    Total Protein 6.4 - 8.2 g/dL   6.5    MAGNESIUM 1.60 - 2.40 mg/dL 1.91   2.00   Lactate 0.4 - 2.0 mmol/L 1.2      BNP 0 - 99 pg/mL 285 (H)      Troponin I, High Sensitivity (CMC) 0 - 53 ng/L 113 (H)      INR 0.9 - 1.1  1.2 (H)      Heparin Unfractionated See Comment Below for Therapeutic Ranges IU/mL   0.2    Protime 9.8 - 12.8 seconds 13.7 (H)      aPTT 27 - 38 seconds 27      WBC 4.4 - 11.3 x10*3/uL 8.9  9.3    nRBC 0.0 - 0.0 /100 WBCs 0.0  0.0    RBC 4.50 - 5.90 x10*6/uL 3.45 (L)  3.76 (L)    HEMOGLOBIN 13.5 - 17.5 g/dL 10.7 (L)  11.5 (L)    HEMATOCRIT 41.0 - 52.0 % 31.2 (L)  34.8 (L)    MCV 80 - 100 fL 90  93    MCH 26.0 - 34.0 pg 31.0  30.6    MCHC 32.0 - 36.0 g/dL 34.3  33.0    RED CELL DISTRIBUTION WIDTH 11.5 - 14.5 % 14.5  14.6 (H)    Platelets 150 - 450 x10*3/uL 275  264    Neutrophils % 40.0 - 80.0 % 62.9  58.1    Immature Granulocytes %, Automated 0.0 - 0.9 % 0.3  0.4    Lymphocytes % 13.0 -  44.0 % 23.6  28.5    Monocytes % 2.0 - 10.0 % 7.3  7.1    Eosinophils % 0.0 - 6.0 % 5.3  5.5    Basophils % 0.0 - 2.0 % 0.6  0.4    Neutrophils Absolute 1.20 - 7.70 x10*3/uL 5.63  5.39    Immature Granulocytes Absolute, Automated 0.00 - 0.70 x10*3/uL 0.03  0.04    Lymphocytes Absolute 1.20 - 4.80 x10*3/uL 2.11  2.65    Monocytes Absolute 0.10 - 1.00 x10*3/uL 0.65  0.66    Eosinophils Absolute 0.00 - 0.70 x10*3/uL 0.47  0.51    Basophils Absolute 0.00 - 0.10 x10*3/uL 0.05  0.04    POCT pH, Venous 7.33 - 7.43 pH  7.42     POCT pCO2, Venous 41 - 51 mm Hg  40 (L)     POCT pO2, Venous 35 - 45 mm Hg  24 (L)     POCT SO2, Venous 45 - 75 %  33 (L)     POCT Oxy Hemoglobin, Venous 45.0 - 75.0 %  32.3 (L)     POCT Hematocrit Calculated, Venous 41.0 - 52.0 %  29.0 (L)     POCT Sodium, Venous 136 - 145 mmol/L  133 (L)     POCT Potassium, Venous 3.5 - 5.3 mmol/L  3.9     POCT Chloride, Venous 98 - 107 mmol/L  103     POCT Ionized Calicum, Venous 1.10 - 1.33 mmol/L  1.21     POCT Glucose, Venous 74 - 99 mg/dL  97     POCT Lactate, Venous 0.4 - 2.0 mmol/L  1.2     POCT Base Excess, Venous -2.0 - 3.0 mmol/L  1.3     POCT HCO3 Calculated, Venous 22.0 - 26.0 mmol/L  25.9     POCT Hemoglobin, Venous 13.5 - 17.5 g/dL  9.8 (L)     POCT Anion Gap, Venous 10.0 - 25.0 mmol/L  8.0 (L)     FiO2 %  21     Patient Temperature degrees Celsius  37.0     (L): Data is abnormally low  (H): Data is abnormally high    Assessment and Plan     Nino Crocker is a 69 y.o. year old male patient with PMHx significant for psoriatic arthritis (on infliximab), CVA (07/2024), HTN, seizure disorder, hx NSTEMI, and recent traumatic T6-T7 fracture (after a fall 07/2024) resulting in 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 bladder and bowel, [facility] course for patient was significant for  elevated trop, BNP, right heart strain on EKG. Patient hemodynamically stable on arrival  to CICU, breathing well on room air. Will obtain formal TTE in AM, request [facility] CT scan images for RV:LV ratio. Vasc med consult in AM.     NEUROLOGY/PSYCH:    #Altered Mental Status  :: Suspect 2/2 UTI primarily, also potential polypharmacy iso KORTNEY (gabapentin, oxycodone, methocarbamol, cyclobenzaprine)  :: prior UCx 8/27/24: Klebsiella (R: ampicillin, S: CTX)  :: UA [facility]: pH 5.5, 50 protein, 9 RBC, > 182 WBC, 500 LE, +nitrite, 4  + bacteria  :: Martinez exchanged 10/29 per family  - Continue 1 g CTX q24h (started at [facility] on 10/30/24) for 10 day course  (has allergy to 1st gen cephalosporin but no cross reactivity noted with 3rd  gen cephalosporins)  -hold cyclobenzaprine, methocarbamol, gabapentin, oxycodone  [ ] f/u BCX, UCx from OSH, will not repeat here  [ ] CTH obtained at OSH, request images   [ ] Delirium precautions, Seroquel PRN for agitation    #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 bladder and bowel,  - Hold home anti-spasmodic for now given AMS, consider re-initiation     #Seizure disorder  -c/w Keppra and lamotrigine    CARDIOVASCULAR:    #Pulmonary Embolism, Intermediate to High Risk  #Right heart strain  :: TTE 9/13/24: EF 60-65%, concentric remodeling of LV, normal RV size &  function, mild AS, mild AR  :: +Stewart's sign on OSH ED POCUS 10/31/24, R.BBB pattern on EKG  :: +BNP and troponin elevation  :: No tachycardia or hypotension noted in CICU, however these were reported at the [facility] (SBP in 90s, HR in 110s), now improved with fluids  :: FRANCISCO Stage II-III  [ ] Vasc Med consult   [ ] DVT US bilateral LE  [ ] Obtain images from OSH  [ ] Formal TTE in AM  [ ] Continue heparin gtt      #Hx NSTEMI  #Hx CVA  -c/w ASA, atorvastatin, fenofibrate    #HTN  -hold losartan, amlodipine, lasix given hypotension 90s/50s    PULMONARY:    # AHRF, resolved  - Wean oxygen as tolerated    RENAL/GENITOURINARY:    #KORTNEY, improved  :: Cr at OSH initially 1.5 from baseline 0.8, improved on CICU  admission  :: likely prerenal iso hypovolemia and poor PO intake vs use of nephrotoxic  medications  :: s/p 2L LR in ED at OSH  -hold meloxicam, furosemide, losartan  [ ] continue to trend, hold nephrotoxins      GASTROENTEROLOGY:    #Neurogenic Bowel/Bladder  -  Continue home bowel regimen    ENDOCRINOLOGY:    # T2DM  :: A1c 6.3% 08/2024  -hold metformin while inpt  -LDSSI  -q4 checks while NPO  -hypoglycemia protocol    RHEUM:    #Gout  -hold allopurinol iso KORTNEY    INFECTIOUS DISEASE:    #Urinary Tract Infection  - Continue CTX 1g q24h  - Follow OSH urine cultures      ICU Check List     FENA  Fluids: Cautious in setting of RHS  Electrolytes: K>4, Mg>2  Nutrition: Pending bedside swallow eval  Access: PIVs    Prophylaxis:  DVT ppx: heparin gtt  GI ppx: PPI  Bowel care: Bisacodyl Senna    Code: Full Code    HPOA: Spouse Leticia [phone]    11/01/24 at 4:16 AM      Disclaimer: Documentation completed with the information available at the time of input. The times in the chart may not be reflective of actual patient care times, interventions, or procedures. Documentation occurs after the physical care of the patient.

## 2024-11-01 NOTE — SIGNIFICANT EVENT
Updated plan of care from H&P earlier today    Vitals:    11/01/24 1300   BP: 165/73   Pulse: 92   Resp: 19   Temp:    SpO2: 100%   I/O last 3 completed shifts:  In: 237.3 (2 mL/kg) [P.O.:50; I.V.:187.3 (1.6 mL/kg)]  Out: 300 (2.6 mL/kg) [Urine:300 (0.1 mL/kg/hr)]  Weight: 117 kg   I/O this shift:  In: 232 [P.O.:100; I.V.:132]  Out: 1000 [Urine:1000]        Physical exam:  Constitutional/neuro: Sleepy but comfortable, alert and oriented x 1, follows commands.  Occasional jerking movements which may have been patient being startled.  Cardiac: No murmurs rubs or gallops  Pulmonary: decreased sounds bases.  On room air, no increased work of breathing  Abdomen: nt, nd,  Ext: 2+ lower extremity edema.      TTE  CONCLUSIONS:   1. Left ventricular ejection fraction is normal, calculated by Obando's biplane at 55%.   2. Spectral Doppler shows a normal pattern of left ventricular diastolic filling.   3. There is moderately reduced right ventricular systolic function.   4. Stewart's sign is present, suggestive of pulmonary embolism.   5. Moderately enlarged right ventricle.   6. Mildly elevated right ventricular systolic pressure.   7. Aortic valve sclerosis.    A/P   Nino Crocker is a 69 y.o. 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 initially with low normal BP 90s/60s, but on RA. found to have saddle PE with extensive bilateral PE, +Mcconnels sign. Transfer to [facility] after PERT call for PE (see telephone call in encounters for full note from PERT team call). On [facility] to [facility], HDS, no O2 requirement, , Troponin 141 but downtrending. PESI 160 high risk (elevation related to AMS which we  suspect is from UTI). Plan to continue heparin gtt, ctx for UTI (follow infectious workup in [facility]S). 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  He also has several antispasmodics, gabapentin, and oxycodone which he takes.  As per daughter-in-law, several days ago at nursing home, his medications may have all abruptly been resumed after a hiatus.  Currently holding antispasmodics and oxycodone, would slowly taper off gabapentin to prevent risk of seizures.  Would cautiously use medications for agitation.  Patient may have improved mental status with treatment for UTI.  Considering his seizures, it may not be unreasonable to consider on EEG possible neuro consult -no obvious jerking or shaking movements, but it is possible he is having subclinical seizures.      NEUROLOGY/PSYCH:     #Altered Mental Status  :: Suspect 2/2 UTI primarily, also potential polypharmacy iso KORTNEY (gabapentin, oxycodone, methocarbamol, cyclobenzaprine)  :: prior UCx 8/27/24: Klebsiella (R: ampicillin, S: CTX)  :: [facility]: pH 5.5, 50 protein, 9 RBC, > 182 WBC, 500 LE, +nitrite, 4  + bacteria  :: Martinez exchanged 10/29 per family  - Continue 1 g CTX q24h (started at [facility] on 10/30/24) for 10 day course  (has allergy to 1st gen cephalosporin but no cross reactivity noted with 3rd  gen cephalosporins)  -hold cyclobenzaprine, methocarbamol, gabapentin, oxycodone  [ ] f/u BCX, UCx from OSH, will not repeat here (from [facility])   [ ] CTH obtained at OSH, request images   [ ] Delirium precautions  [ ] would avoid Seroquel PRN for agitation  [ ] slowly decrease dose of gabapentin      #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 bladder and bowel,  - Hold home anti-spasmodic for now given AMS, consider re-initiation     #Seizure disorder  -c/w Keppra and  lamotrigine  -no clear seizure-like activity noted yet.   - [ ] If no improvement of mental status with medication adjustment and antibiotics, would consider EEG and neurology consult     CARDIOVASCULAR:     #Pulmonary Embolism, Intermediate to High Risk  #Right heart strain  :: TTE 9/13/24: EF 60-65%, concentric remodeling of LV, normal RV size &  function, mild AS, mild AR  :: +Stewart's sign on OSH ED POCUS 10/31/24, R.BBB pattern on EKG  :: +BNP and troponin elevation  :: No tachycardia or hypotension noted in CICU, however these were reported at the [facility] (SBP in 90s, HR in 110s), now improved with fluids  :: FRANCISCO Stage II-III  :: TTE at [facility] with preserved EF, mcconeels sign   [ ] follow DVT US bilateral LE  [ ] Obtain CTPE images from OSH  [ ] Continue heparin gtt  [ ] Consider vascular medicine consult for home-going anticoagulation       #Hx NSTEMI  #Hx CVA  -c/w ASA, atorvastatin, fenofibrate    #HTN  -hold losartan, amlodipine, lasix given hypotension 90s/50s     PULMONARY:     # AHRF, resolved  - Wean oxygen as tolerated     RENAL/GENITOURINARY:     #KORTNEY, improved  :: Cr at OSH initially 1.5 from baseline 0.8, improved on CICU admission  :: likely prerenal iso hypovolemia and poor PO intake vs use of nephrotoxic  medications  :: s/p 2L LR in ED at OSH  -hold meloxicam, furosemide, losartan  [ ] continue to trend, hold nephrotoxins       GASTROENTEROLOGY:     #Neurogenic Bowel/Bladder  -  Continue home bowel regimen     ENDOCRINOLOGY:     # T2DM  :: A1c 6.3% 08/2024  -hold metformin while inpt  -LDSSI  -q4 checks while NPO  -hypoglycemia protocol     RHEUM:    #Gout  -hold allopurinol iso KORTNEY     INFECTIOUS DISEASE:     #Urinary Tract Infection  - Continue CTX 1g q24h  - Follow OSH urine cultures        ICU Check List      FENA  Fluids: Cautious in setting of RHS  Electrolytes: K>4, Mg>2  Nutrition: Pending bedside swallow eval  Access: PIVs     Prophylaxis:  DVT ppx: heparin gtt  GI ppx: PPI  Bowel care:  Bisacodyl, Senna     Code: Full Code  - confirmed with family 10/31 and 11/1  As per daughter in law patient previously stated he wants to be full code  HPOA: Spouse Leticia [phone]
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
## 2024-11-01 NOTE — CONSULTS
"Nutrition Initial Assessment:   Nutrition Assessment    Reason for Assessment: Admission nursing screening    Patient is a 69 y.o. male presenting with a change in mental status.     Past medical history includes psoriatic arthritis (on infliximab), CVA (07/2024), HTN, seizure disorder, hx NSTEMI, and recent traumatic T6-T7 fracture (after a fall 07/2024) resulting in 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 bladder and bowel     Nutrition History:  Food and Nutrient History: Met with patient's daughter-in-law.  She reports that pt had a good appetite and intake PTA but that he may have been getting dehydrated at the facility he was at the last 3 weeks.  Reports that they were working with dietitian at facility to get his diet upgraded to Regular from mechanical soft. Pt's wife was bringing him food from the outside like a burger from [employer]. Pt has no teeth and has a slur that is sometimes misconstrued as trouble swallowing. She says that he is NPO now d/t his mental status as she says that he is trying to come out of \"gabapentin toxicity\".  When asked about his 10kg weight loss, she suspects this is accurate as she does not feel that he was gettng good care at his most recent facility.  No GI issues to report.  She is hoping to have Ensure or Boost sent on his meal trays when he is allowed to eat. RDN asked about Ensure High Protein on his orders from mid-August and she said she did not see him get this or drink this.  Also mentions Magic Cup as a possible supplement for him. Says that he was supposed to get a colostomy today       Anthropometrics:  Height: 190.5 cm (6' 3\")   Weight: 117 kg (257 lb 15 oz)   BMI (Calculated): 32.24  IBW/kg (Dietitian Calculated): 89 kg  Percent of IBW: 131 %     Weight History:     11/1/24: 117kg  8/14/24: 126.6kg  7/31/24: 128.6kg  7/12/24: 123.7kg    Pt is down 8% in weight in less than 3 months-- significant loss.     Weight Change %:       Nutrition Focused Physical " "Exam Findings:    Subcutaneous Fat Loss:   Orbital Fat Pads: Well nourished (slightly bulging fat pads)  Buccal Fat Pads: Mild-Moderate (flat cheeks, minimal bounce)  Triceps: Well nourished (ample fat tissue)  Muscle Wasting:  Temporalis: Well nourished (well-defined muscle)  Pectoralis (Clavicular Region): Well nourished (clavicle not visible)  Deltoid/Trapezius: Well nourished (rounded appearance at arm, shoulder, neck)  Quadriceps: Well nourished (well developed, well rounded)  Gastrocnemius: Well nourished (well developed bulbous muscle)  Edema:  Edema: +2 mild  Edema Location: generalized  Physical Findings:  Skin: Positive (PI back)    Nutrition Significant Labs:  A1C:  Lab Results   Component Value Date    HGBA1C 7.1 (H) 07/11/2024   , BG POCT trend:   Results from last 7 days   Lab Units 11/01/24  1122 11/01/24  0843   POCT GLUCOSE mg/dL 113* 104*    , Renal Lab Trend:   Results from last 7 days   Lab Units 11/01/24  0005 11/01/24  0004 10/31/24  1911   POTASSIUM mmol/L  --  4.2 4.0   PHOSPHORUS mg/dL 3.4  --  3.4   SODIUM mmol/L  --  136 136   MAGNESIUM mg/dL 2.00  --  1.91   EGFR mL/min/1.73m*2  --  67 71   BUN mg/dL  --  16 16   CREATININE mg/dL  --  1.17 1.12    , Vit D: No results found for: \"VITD25\"     Nutrition Specific Medications:  Scheduled medications  amLODIPine, 10 mg, oral, Daily  aspirin, 81 mg, oral, Daily  atorvastatin, 40 mg, oral, Daily  [START ON 11/5/2024] bisacodyl, 10 mg, oral, Every Tues/Thur  cefTRIAXone, 1 g, intravenous, q24h  cyanocobalamin, 1,000 mcg, oral, Daily  fenofibrate, 54 mg, oral, Daily  [Held by provider] gabapentin, 600 mg, oral, TID  insulin lispro, 0-5 Units, subcutaneous, TID  lamoTRIgine, 250 mg, oral, BID  levETIRAcetam, 1,250 mg, oral, BID  pantoprazole, 40 mg, oral, Daily before breakfast  sennosides, 2 tablet, oral, BID      Continuous medications  heparin, 0-4,500 Units/hr, Last Rate: 2,200 Units/hr (11/01/24 6024)      PRN medications  PRN medications: " dextrose, dextrose, glucagon, glucagon, heparin     I/O:   Last BM Date: 10/31/24; Stool Appearance: Formed (10/31/24 2300)        Dietary Orders (From admission, onward)       Start     Ordered    11/01/24 0730  May Participate in Room Service  ( ROOM SERVICE MAY PARTICIPATE)  Once        Question:  .  Answer:  Yes    11/01/24 0729    10/31/24 2025  NPO Diet; Effective now  Diet effective now         10/31/24 2025                     Estimated Needs:   Total Energy Estimated Needs (kCal):  (2400+)  Method for Estimating Needs: MSJ= 2020  Total Protein Estimated Needs (g): 135 g  Method for Estimating Needs: 1.5 x 89kg            Nutrition Diagnosis        Nutrition Diagnosis  Patient has Nutrition Diagnosis: Yes  Diagnosis Status (1): New  Nutrition Diagnosis 1: Increased nutrient needs  Related to (1): back wound  As Evidenced by (1): altered metabolism in the setting of wound healing     Difficult to determine overall nutrition status in patient.  Family reports good intake PTA.  Has weight loss but this may be anticipated in light of broken pack/paraplegia in July.  Will continue to follow.    Nutrition Interventions/Recommendations         Nutrition Prescription:   Once more awake can have RN do bedside swallow screen vs consulting SLP for formal eval in light of previous need for altered consistency diet.  If pt's mental status does not improve in the next few days, may need to consider a dobhoff for enteral access for hydration and nutrition as well as meds.   Reconsult this service if this is the plan.  Check Vitamin D level.          Nutrition Interventions:    RDN can order Magic Cup BID= 290kcasl, 9 grams protein each and Ensure Plus BID= 350kcals 13grams protein each       Nutrition Education:   Not appropriate       Nutrition Monitoring and Evaluation   Food/Nutrient Related History Monitoring  Monitoring and Evaluation Plan:  (ability for patient to take a PO diet vs need for temporary  dobhoff/TF)         Time Spent (min): 60 minutes

## 2024-11-02 LAB
ALBUMIN SERPL BCP-MCNC: 3.4 G/DL (ref 3.4–5)
ALP SERPL-CCNC: 82 U/L (ref 33–136)
ALT SERPL W P-5'-P-CCNC: 4 U/L (ref 10–52)
ANION GAP SERPL CALC-SCNC: 16 MMOL/L (ref 10–20)
AST SERPL W P-5'-P-CCNC: 11 U/L (ref 9–39)
BASOPHILS # BLD AUTO: 0.07 X10*3/UL (ref 0–0.1)
BASOPHILS NFR BLD AUTO: 0.8 %
BILIRUB SERPL-MCNC: 0.4 MG/DL (ref 0–1.2)
BUN SERPL-MCNC: 13 MG/DL (ref 6–23)
CALCIUM SERPL-MCNC: 9.2 MG/DL (ref 8.6–10.6)
CHLORIDE SERPL-SCNC: 104 MMOL/L (ref 98–107)
CO2 SERPL-SCNC: 22 MMOL/L (ref 21–32)
CREAT SERPL-MCNC: 1.14 MG/DL (ref 0.5–1.3)
EGFRCR SERPLBLD CKD-EPI 2021: 69 ML/MIN/1.73M*2
EOSINOPHIL # BLD AUTO: 0.5 X10*3/UL (ref 0–0.7)
EOSINOPHIL NFR BLD AUTO: 5.9 %
ERYTHROCYTE [DISTWIDTH] IN BLOOD BY AUTOMATED COUNT: 14.6 % (ref 11.5–14.5)
GLUCOSE BLD MANUAL STRIP-MCNC: 123 MG/DL (ref 74–99)
GLUCOSE BLD MANUAL STRIP-MCNC: 131 MG/DL (ref 74–99)
GLUCOSE BLD MANUAL STRIP-MCNC: 131 MG/DL (ref 74–99)
GLUCOSE BLD MANUAL STRIP-MCNC: 156 MG/DL (ref 74–99)
GLUCOSE SERPL-MCNC: 115 MG/DL (ref 74–99)
HCT VFR BLD AUTO: 33.8 % (ref 41–52)
HGB BLD-MCNC: 11 G/DL (ref 13.5–17.5)
IMM GRANULOCYTES # BLD AUTO: 0.04 X10*3/UL (ref 0–0.7)
IMM GRANULOCYTES NFR BLD AUTO: 0.5 % (ref 0–0.9)
LYMPHOCYTES # BLD AUTO: 2.7 X10*3/UL (ref 1.2–4.8)
LYMPHOCYTES NFR BLD AUTO: 31.8 %
MAGNESIUM SERPL-MCNC: 2.25 MG/DL (ref 1.6–2.4)
MCH RBC QN AUTO: 30.5 PG (ref 26–34)
MCHC RBC AUTO-ENTMCNC: 32.5 G/DL (ref 32–36)
MCV RBC AUTO: 94 FL (ref 80–100)
MONOCYTES # BLD AUTO: 0.75 X10*3/UL (ref 0.1–1)
MONOCYTES NFR BLD AUTO: 8.8 %
NEUTROPHILS # BLD AUTO: 4.43 X10*3/UL (ref 1.2–7.7)
NEUTROPHILS NFR BLD AUTO: 52.2 %
NRBC BLD-RTO: 0 /100 WBCS (ref 0–0)
PHOSPHATE SERPL-MCNC: 3.7 MG/DL (ref 2.5–4.9)
PLATELET # BLD AUTO: 327 X10*3/UL (ref 150–450)
POTASSIUM SERPL-SCNC: 3.7 MMOL/L (ref 3.5–5.3)
PROT SERPL-MCNC: 6.7 G/DL (ref 6.4–8.2)
RBC # BLD AUTO: 3.61 X10*6/UL (ref 4.5–5.9)
SODIUM SERPL-SCNC: 138 MMOL/L (ref 136–145)
UFH PPP CHRO-ACNC: 0.4 IU/ML
WBC # BLD AUTO: 8.5 X10*3/UL (ref 4.4–11.3)

## 2024-11-02 PROCEDURE — 84100 ASSAY OF PHOSPHORUS: CPT

## 2024-11-02 PROCEDURE — 36415 COLL VENOUS BLD VENIPUNCTURE: CPT

## 2024-11-02 PROCEDURE — 85025 COMPLETE CBC W/AUTO DIFF WBC: CPT

## 2024-11-02 PROCEDURE — 2500000001 HC RX 250 WO HCPCS SELF ADMINISTERED DRUGS (ALT 637 FOR MEDICARE OP)

## 2024-11-02 PROCEDURE — 2500000005 HC RX 250 GENERAL PHARMACY W/O HCPCS

## 2024-11-02 PROCEDURE — 99233 SBSQ HOSP IP/OBS HIGH 50: CPT

## 2024-11-02 PROCEDURE — 82947 ASSAY GLUCOSE BLOOD QUANT: CPT

## 2024-11-02 PROCEDURE — 2500000004 HC RX 250 GENERAL PHARMACY W/ HCPCS (ALT 636 FOR OP/ED)

## 2024-11-02 PROCEDURE — 1200000002 HC GENERAL ROOM WITH TELEMETRY DAILY

## 2024-11-02 PROCEDURE — 83735 ASSAY OF MAGNESIUM: CPT

## 2024-11-02 PROCEDURE — 85520 HEPARIN ASSAY: CPT

## 2024-11-02 PROCEDURE — 80053 COMPREHEN METABOLIC PANEL: CPT

## 2024-11-02 RX ORDER — TIZANIDINE 2 MG/1
2 TABLET ORAL EVERY 6 HOURS PRN
Status: DISCONTINUED | OUTPATIENT
Start: 2024-11-02 | End: 2024-11-08 | Stop reason: HOSPADM

## 2024-11-02 RX ORDER — ACETAMINOPHEN 160 MG/5ML
650 SOLUTION ORAL EVERY 4 HOURS PRN
Status: DISCONTINUED | OUTPATIENT
Start: 2024-11-02 | End: 2024-11-08 | Stop reason: HOSPADM

## 2024-11-02 RX ORDER — OXYCODONE HYDROCHLORIDE 5 MG/1
5 TABLET ORAL EVERY 6 HOURS PRN
Status: DISCONTINUED | OUTPATIENT
Start: 2024-11-02 | End: 2024-11-08 | Stop reason: HOSPADM

## 2024-11-02 RX ORDER — ACETAMINOPHEN 325 MG/1
650 TABLET ORAL EVERY 4 HOURS PRN
Status: DISCONTINUED | OUTPATIENT
Start: 2024-11-02 | End: 2024-11-08 | Stop reason: HOSPADM

## 2024-11-02 RX ADMIN — GABAPENTIN 400 MG: 300 CAPSULE ORAL at 20:38

## 2024-11-02 RX ADMIN — HEPARIN SODIUM 2200 UNITS/HR: 10000 INJECTION, SOLUTION INTRAVENOUS at 06:27

## 2024-11-02 RX ADMIN — CEFTRIAXONE SODIUM 1 G: 1 INJECTION, SOLUTION INTRAVENOUS at 02:45

## 2024-11-02 RX ADMIN — LEVETIRACETAM 1250 MG: 500 TABLET, FILM COATED ORAL at 20:38

## 2024-11-02 RX ADMIN — ATORVASTATIN CALCIUM 40 MG: 40 TABLET, FILM COATED ORAL at 09:26

## 2024-11-02 RX ADMIN — OXYCODONE HYDROCHLORIDE 5 MG: 5 TABLET ORAL at 21:47

## 2024-11-02 RX ADMIN — GABAPENTIN 400 MG: 300 CAPSULE ORAL at 17:06

## 2024-11-02 RX ADMIN — LEVETIRACETAM 1250 MG: 500 TABLET, FILM COATED ORAL at 09:26

## 2024-11-02 RX ADMIN — FENOFIBRATE 54 MG: 54 TABLET, FILM COATED ORAL at 09:26

## 2024-11-02 RX ADMIN — ACETAMINOPHEN 650 MG: 325 TABLET ORAL at 21:46

## 2024-11-02 RX ADMIN — LAMOTRIGINE 250 MG: 100 TABLET ORAL at 09:25

## 2024-11-02 RX ADMIN — LAMOTRIGINE 250 MG: 100 TABLET ORAL at 20:38

## 2024-11-02 RX ADMIN — ACETAMINOPHEN 650 MG: 160 SOLUTION ORAL at 03:05

## 2024-11-02 RX ADMIN — CYANOCOBALAMIN TAB 1000 MCG 1000 MCG: 1000 TAB at 09:26

## 2024-11-02 RX ADMIN — LIDOCAINE 3 PATCH: 4 PATCH TOPICAL at 09:27

## 2024-11-02 RX ADMIN — STANDARDIZED SENNA CONCENTRATE 17.2 MG: 8.6 TABLET ORAL at 09:26

## 2024-11-02 RX ADMIN — ASPIRIN 81 MG: 81 TABLET, COATED ORAL at 09:25

## 2024-11-02 RX ADMIN — HEPARIN SODIUM 2200 UNITS/HR: 10000 INJECTION, SOLUTION INTRAVENOUS at 20:12

## 2024-11-02 RX ADMIN — OXYCODONE HYDROCHLORIDE 5 MG: 5 TABLET ORAL at 15:33

## 2024-11-02 RX ADMIN — GABAPENTIN 400 MG: 300 CAPSULE ORAL at 09:25

## 2024-11-02 ASSESSMENT — PAIN SCALES - GENERAL
PAINLEVEL_OUTOF10: 7
PAINLEVEL_OUTOF10: 8

## 2024-11-02 ASSESSMENT — COGNITIVE AND FUNCTIONAL STATUS - GENERAL
TOILETING: TOTAL
MOVING TO AND FROM BED TO CHAIR: A LOT
CLIMB 3 TO 5 STEPS WITH RAILING: TOTAL
DRESSING REGULAR LOWER BODY CLOTHING: A LOT
DRESSING REGULAR UPPER BODY CLOTHING: A LOT
EATING MEALS: A LITTLE
TURNING FROM BACK TO SIDE WHILE IN FLAT BAD: A LOT
MOVING FROM LYING ON BACK TO SITTING ON SIDE OF FLAT BED WITH BEDRAILS: A LOT
HELP NEEDED FOR BATHING: A LOT
PERSONAL GROOMING: A LOT
MOBILITY SCORE: 9
STANDING UP FROM CHAIR USING ARMS: TOTAL
WALKING IN HOSPITAL ROOM: TOTAL
DAILY ACTIVITIY SCORE: 12

## 2024-11-02 NOTE — PROGRESS NOTES
"Nino Crocker is a 69 y.o. male on day 2 of admission presenting with Acute saddle pulmonary embolism, unspecified whether acute cor pulmonale present (Multi).      Subjective   NAEO. Patient remains altered this morning but is arousable and able to participate in participate in simple conversation. When asked how he is doing, he repeatedly answers \"freezing.\" Likewise answers \"freezing\" to all orientation questions. Denies any current chest pain or SOB.        Objective     Last Recorded Vitals  /55   Pulse 81   Temp 36.3 °C (97.3 °F)   Resp 18   Wt 117 kg (257 lb 15 oz)   SpO2 94%   Intake/Output last 3 Shifts:    Intake/Output Summary (Last 24 hours) at 11/2/2024 1257  Last data filed at 11/2/2024 0900  Gross per 24 hour   Intake 132 ml   Output 2450 ml   Net -2318 ml       Admission Weight  Weight: 117 kg (257 lb 15 oz) (11/01/24 0000)    Daily Weight  11/01/24 : 117 kg (257 lb 15 oz)      Physical Exam  Constitutional:       Comments: Drowsy but arousable to voice. In no acute distress   HENT:      Head: Normocephalic and atraumatic.      Mouth/Throat:      Mouth: Mucous membranes are moist.      Pharynx: Oropharynx is clear. No posterior oropharyngeal erythema.   Eyes:      General: No scleral icterus.     Extraocular Movements: Extraocular movements intact.      Conjunctiva/sclera: Conjunctivae normal.      Pupils: Pupils are equal, round, and reactive to light.   Cardiovascular:      Rate and Rhythm: Normal rate and regular rhythm.      Pulses: Normal pulses.      Heart sounds: Normal heart sounds.   Pulmonary:      Effort: Pulmonary effort is normal. No respiratory distress.      Breath sounds: Normal breath sounds. No wheezing.   Abdominal:      General: Abdomen is flat. There is no distension.      Palpations: Abdomen is soft.      Tenderness: There is no abdominal tenderness.   Musculoskeletal:         General: Normal range of motion.      Cervical back: Normal range of motion and neck " supple.      Comments: Scant b/l LE edema   Skin:     General: Skin is warm and dry.      Findings: No lesion or rash.      Comments: B/l toenail onchymycosis    Neurological:      Comments: Drowsy but arousable to voice, intermittently cooperative when asked questions. Disoriented to person, place, and time       Relevant Results  Results for orders placed or performed during the hospital encounter of 10/31/24 (from the past 24 hours)   Renal function panel   Result Value Ref Range    Glucose 99 74 - 99 mg/dL    Sodium 136 136 - 145 mmol/L    Potassium 4.1 3.5 - 5.3 mmol/L    Chloride 102 98 - 107 mmol/L    Bicarbonate 23 21 - 32 mmol/L    Anion Gap 15 10 - 20 mmol/L    Urea Nitrogen 13 6 - 23 mg/dL    Creatinine 1.10 0.50 - 1.30 mg/dL    eGFR 72 >60 mL/min/1.73m*2    Calcium 8.8 8.6 - 10.6 mg/dL    Phosphorus 4.1 2.5 - 4.9 mg/dL    Albumin 3.2 (L) 3.4 - 5.0 g/dL   POCT GLUCOSE   Result Value Ref Range    POCT Glucose 100 (H) 74 - 99 mg/dL   POCT GLUCOSE   Result Value Ref Range    POCT Glucose 137 (H) 74 - 99 mg/dL   Magnesium   Result Value Ref Range    Magnesium 2.25 1.60 - 2.40 mg/dL   Comprehensive Metabolic Panel   Result Value Ref Range    Glucose 115 (H) 74 - 99 mg/dL    Sodium 138 136 - 145 mmol/L    Potassium 3.7 3.5 - 5.3 mmol/L    Chloride 104 98 - 107 mmol/L    Bicarbonate 22 21 - 32 mmol/L    Anion Gap 16 10 - 20 mmol/L    Urea Nitrogen 13 6 - 23 mg/dL    Creatinine 1.14 0.50 - 1.30 mg/dL    eGFR 69 >60 mL/min/1.73m*2    Calcium 9.2 8.6 - 10.6 mg/dL    Albumin 3.4 3.4 - 5.0 g/dL    Alkaline Phosphatase 82 33 - 136 U/L    Total Protein 6.7 6.4 - 8.2 g/dL    AST 11 9 - 39 U/L    Bilirubin, Total 0.4 0.0 - 1.2 mg/dL    ALT 4 (L) 10 - 52 U/L   Phosphorus   Result Value Ref Range    Phosphorus 3.7 2.5 - 4.9 mg/dL   CBC and Auto Differential   Result Value Ref Range    WBC 8.5 4.4 - 11.3 x10*3/uL    nRBC 0.0 0.0 - 0.0 /100 WBCs    RBC 3.61 (L) 4.50 - 5.90 x10*6/uL    Hemoglobin 11.0 (L) 13.5 - 17.5 g/dL     Hematocrit 33.8 (L) 41.0 - 52.0 %    MCV 94 80 - 100 fL    MCH 30.5 26.0 - 34.0 pg    MCHC 32.5 32.0 - 36.0 g/dL    RDW 14.6 (H) 11.5 - 14.5 %    Platelets 327 150 - 450 x10*3/uL    Neutrophils % 52.2 40.0 - 80.0 %    Immature Granulocytes %, Automated 0.5 0.0 - 0.9 %    Lymphocytes % 31.8 13.0 - 44.0 %    Monocytes % 8.8 2.0 - 10.0 %    Eosinophils % 5.9 0.0 - 6.0 %    Basophils % 0.8 0.0 - 2.0 %    Neutrophils Absolute 4.43 1.20 - 7.70 x10*3/uL    Immature Granulocytes Absolute, Automated 0.04 0.00 - 0.70 x10*3/uL    Lymphocytes Absolute 2.70 1.20 - 4.80 x10*3/uL    Monocytes Absolute 0.75 0.10 - 1.00 x10*3/uL    Eosinophils Absolute 0.50 0.00 - 0.70 x10*3/uL    Basophils Absolute 0.07 0.00 - 0.10 x10*3/uL   Heparin Assay, UFH   Result Value Ref Range    Heparin Unfractionated 0.4 See Comment Below for Therapeutic Ranges IU/mL   POCT GLUCOSE   Result Value Ref Range    POCT Glucose 131 (H) 74 - 99 mg/dL   POCT GLUCOSE   Result Value Ref Range    POCT Glucose 156 (H) 74 - 99 mg/dL          Assessment/Plan      Nino Crocker is a 69 y.o. 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 Found to have bilateral saddle PE with +Stewart's sign in course of work-up. Transferred to  cICU for further management, ultimately not deemed a thrombectomy or tPA candidate in the setting of HDS and normal O2 saturation on RA. Transferred to floor on 11/2.     Updates 11/2:  -Remains altered but with some improvement in mentation/ability to participate in conversation  -Will continue ceftriaxone for UTI, follow-[facility] and [facility] cultures [facility] for de-escalation. Will plan for total 10 day course (10/30-11/8)  -Continuing to hold home anti-spasmodics in setting  of persistent encephalopathy   -Will touch base with patient's family regarding baseline mental status     #Acute encephalopathy, suspected 2/2 UTI and polypharmacy  -Continue 1g ceftriaxone daily, will follow-up urine cx from [facility]. Plan for total 10 day course (10/30-11/8)  -Holding home cyclobenzaprine, methocarbamol, oxycodone in setting of persistent altered mentation  -Delirium precautions in place     #Pulmonary Embolism, Intermediate to High Risk  #Right heart strain  -No tachycardia or hypotension noted in CICU, however these were reported at the [facility] (SBP in 90s, HR in 110s), now improved with fluids  -FRANCISCO Stage II-III  -TTE at [facility] with preserved EF, + Stewart's sign  -Will continue heparin gtt, transition to DOAC closer to time of discharge  -Pending b/l LE DVT US  -Holding home anti-HTNs given likely preload dependence with known right heart strain     #KORTNEY, improved  -Cr baseline 0.8 --> 1.5 -->1.1   -likely prerenal iso hypovolemia and poor PO intake vs use of nephrotoxic medications  -s/p 2L LR in ED at OSH  -Holding home meloxicam, furoresmide, losartan  -hold meloxicam, furosemide, losartan  Plan:      #Seizure disorder  -Continue  home Keppra and lamotrigine  -No seizure-like activity noted; will continue to monitor closely, consider EEG if mental status does not continue to improve with infection and polypharmacy management     #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 bladder and bowel  -Continue home bowel regimen  -Holding home anti-spasmodics as per above     # T2DM  -A1c 6.3% 08/2024  -Holding home metformin  -Continue with SSI, qACHS POCT glucose checks, hypoglycemia protocol  Plan:      #HTN  -Holding losartan, amlodipine, lasix iso soft pressures/right heart strain  -Will re-initiation as needed/tolerated     #Hx NSTEMI  #Hx CVA  -Continue home aspirin, atorvastatin, fenofibrate     #Gout  -Holding home allopurinol iso of KORTNEY        F: prn  E: prn  N: regular   GI ppx: PPI  DVT ppx:  pLov    Bowel regimen: senna   Lines, Drains, Airways: pIV x2, Martinez catheter     Full Code   NOK/HPOA: wife [phone]              Pricila Morrell MD

## 2024-11-02 NOTE — SIGNIFICANT EVENT
Rapid Response Nurse Note: RADAR alert: 9    Pager time:   Arrival time:   Event end time:   Location: [address]  [x] Triage by phone or secure messaging    Rapid response initiated by:  [] Rapid response RN [] Family [] Nursing Supervisor [] Physician   [x] RADAR auto page [] Sepsis auto-page [] RN [] RT   [] NP/PA [] Other:     Primary reason for call:   [] BAT [] New CPAP/BiPAP [] Bleeding [] Change in mental status   [] Chest pain [] Code blue [] FiO2 >/= 50% [] HR </= 40 bpm   [] HR >/= 130 bpm [] Hyperglycemia [] Hypoglycemia [x] RADAR    [] RR </= 8 bpm [] RR >/= 30 bpm [] SBP </= 90 mmHg [] SpO2 < 90%   [] Seizure [] Sepsis [] Shortness of breath  [] Staff concern: see comments     Initial VS and/or RADAR VS: T 36.8 °C; ; RR 21; BP 90/66; SPO2 93%.    Providers present at bedside (if applicable):     Name of ICU Provider contacted (if applicable):     Interventions:  [x] None [] ABG/VBG [] Assist w/ICU transfer [] BAT paged    [] Bag mask [] Blood [] Cardioversion [] Code Blue   [] Code blue for intubation [] Code status changed [] Chest x-ray [] EKG   [] IV fluid/bolus [] KUB x-ray [] Labs/cultures [] Medication   [] Nebulizer treatment [] NIPPV (CPAP/BiPAP) [] Oxygen [] Oral airway   [] Peripheral IV [] Palliative care consult [] CT/MRI [] Sepsis protocol    [] Suctioned [] Other:     Outcome:  [] Coded and  [] Code blue for intubation [] Coded and transferred to ICU []  on division   [x] Remained on division (no change) [] Remained on division + additional monitoring [] Remained in ED [] Transferred to ED   [] Transferred to ICU [] Transferred to inpatient status [] Transferred for interventions (procedure) [] Transferred to ICU stepdown    [] Transferred to surgery [] Transferred to telemetry [] Sepsis protocol [] STEMI protocol   [] Stroke protocol [x] Bedside nurse instructed to page rapid response for any concerns or acute change in condition/VS     Additional  Comments:  Bedside RN has no concerns at this time.

## 2024-11-03 VITALS
HEART RATE: 86 BPM | BODY MASS INDEX: 32.07 KG/M2 | RESPIRATION RATE: 16 BRPM | TEMPERATURE: 97.7 F | WEIGHT: 257.94 LBS | HEIGHT: 75 IN | SYSTOLIC BLOOD PRESSURE: 118 MMHG | OXYGEN SATURATION: 92 % | DIASTOLIC BLOOD PRESSURE: 62 MMHG

## 2024-11-03 LAB
ALBUMIN SERPL BCP-MCNC: 3.3 G/DL (ref 3.4–5)
ALP SERPL-CCNC: 84 U/L (ref 33–136)
ALT SERPL W P-5'-P-CCNC: 7 U/L (ref 10–52)
ANION GAP SERPL CALC-SCNC: 14 MMOL/L (ref 10–20)
AST SERPL W P-5'-P-CCNC: 12 U/L (ref 9–39)
BASOPHILS # BLD AUTO: 0.07 X10*3/UL (ref 0–0.1)
BASOPHILS NFR BLD AUTO: 0.8 %
BILIRUB SERPL-MCNC: 0.3 MG/DL (ref 0–1.2)
BUN SERPL-MCNC: 12 MG/DL (ref 6–23)
CALCIUM SERPL-MCNC: 8.8 MG/DL (ref 8.6–10.6)
CHLORIDE SERPL-SCNC: 104 MMOL/L (ref 98–107)
CO2 SERPL-SCNC: 23 MMOL/L (ref 21–32)
CREAT SERPL-MCNC: 1.15 MG/DL (ref 0.5–1.3)
EGFRCR SERPLBLD CKD-EPI 2021: 68 ML/MIN/1.73M*2
EOSINOPHIL # BLD AUTO: 0.64 X10*3/UL (ref 0–0.7)
EOSINOPHIL NFR BLD AUTO: 7.7 %
ERYTHROCYTE [DISTWIDTH] IN BLOOD BY AUTOMATED COUNT: 14.5 % (ref 11.5–14.5)
GLUCOSE BLD MANUAL STRIP-MCNC: 104 MG/DL (ref 74–99)
GLUCOSE BLD MANUAL STRIP-MCNC: 118 MG/DL (ref 74–99)
GLUCOSE BLD MANUAL STRIP-MCNC: 121 MG/DL (ref 74–99)
GLUCOSE BLD MANUAL STRIP-MCNC: 130 MG/DL (ref 74–99)
GLUCOSE SERPL-MCNC: 114 MG/DL (ref 74–99)
HCT VFR BLD AUTO: 34 % (ref 41–52)
HGB BLD-MCNC: 11 G/DL (ref 13.5–17.5)
IMM GRANULOCYTES # BLD AUTO: 0.04 X10*3/UL (ref 0–0.7)
IMM GRANULOCYTES NFR BLD AUTO: 0.5 % (ref 0–0.9)
LYMPHOCYTES # BLD AUTO: 2.59 X10*3/UL (ref 1.2–4.8)
LYMPHOCYTES NFR BLD AUTO: 31.1 %
MAGNESIUM SERPL-MCNC: 2.18 MG/DL (ref 1.6–2.4)
MCH RBC QN AUTO: 30.5 PG (ref 26–34)
MCHC RBC AUTO-ENTMCNC: 32.4 G/DL (ref 32–36)
MCV RBC AUTO: 94 FL (ref 80–100)
MONOCYTES # BLD AUTO: 0.79 X10*3/UL (ref 0.1–1)
MONOCYTES NFR BLD AUTO: 9.5 %
NEUTROPHILS # BLD AUTO: 4.19 X10*3/UL (ref 1.2–7.7)
NEUTROPHILS NFR BLD AUTO: 50.4 %
NRBC BLD-RTO: 0 /100 WBCS (ref 0–0)
PHOSPHATE SERPL-MCNC: 3.6 MG/DL (ref 2.5–4.9)
PLATELET # BLD AUTO: 343 X10*3/UL (ref 150–450)
POTASSIUM SERPL-SCNC: 4.2 MMOL/L (ref 3.5–5.3)
PROT SERPL-MCNC: 6.3 G/DL (ref 6.4–8.2)
RBC # BLD AUTO: 3.61 X10*6/UL (ref 4.5–5.9)
SODIUM SERPL-SCNC: 137 MMOL/L (ref 136–145)
UFH PPP CHRO-ACNC: 0.4 IU/ML
WBC # BLD AUTO: 8.3 X10*3/UL (ref 4.4–11.3)

## 2024-11-03 PROCEDURE — 2500000001 HC RX 250 WO HCPCS SELF ADMINISTERED DRUGS (ALT 637 FOR MEDICARE OP)

## 2024-11-03 PROCEDURE — 2500000004 HC RX 250 GENERAL PHARMACY W/ HCPCS (ALT 636 FOR OP/ED)

## 2024-11-03 PROCEDURE — 85025 COMPLETE CBC W/AUTO DIFF WBC: CPT

## 2024-11-03 PROCEDURE — 82947 ASSAY GLUCOSE BLOOD QUANT: CPT

## 2024-11-03 PROCEDURE — 2500000002 HC RX 250 W HCPCS SELF ADMINISTERED DRUGS (ALT 637 FOR MEDICARE OP, ALT 636 FOR OP/ED)

## 2024-11-03 PROCEDURE — 99233 SBSQ HOSP IP/OBS HIGH 50: CPT

## 2024-11-03 PROCEDURE — 84100 ASSAY OF PHOSPHORUS: CPT

## 2024-11-03 PROCEDURE — 85520 HEPARIN ASSAY: CPT

## 2024-11-03 PROCEDURE — 80053 COMPREHEN METABOLIC PANEL: CPT

## 2024-11-03 PROCEDURE — 1200000002 HC GENERAL ROOM WITH TELEMETRY DAILY

## 2024-11-03 PROCEDURE — 83735 ASSAY OF MAGNESIUM: CPT

## 2024-11-03 PROCEDURE — 36415 COLL VENOUS BLD VENIPUNCTURE: CPT

## 2024-11-03 PROCEDURE — 2500000005 HC RX 250 GENERAL PHARMACY W/O HCPCS

## 2024-11-03 RX ORDER — CHOLECALCIFEROL (VITAMIN D3) 25 MCG
1000 TABLET ORAL DAILY
Start: 2024-11-04 | End: 2024-11-07 | Stop reason: HOSPADM

## 2024-11-03 RX ORDER — LEVOFLOXACIN 750 MG/1
750 TABLET ORAL EVERY 24 HOURS
Start: 2024-11-03 | End: 2024-11-07 | Stop reason: HOSPADM

## 2024-11-03 RX ORDER — TIZANIDINE 2 MG/1
2 TABLET ORAL EVERY 6 HOURS PRN
Start: 2024-11-03

## 2024-11-03 RX ORDER — LIDOCAINE 560 MG/1
3 PATCH PERCUTANEOUS; TOPICAL; TRANSDERMAL DAILY
Start: 2024-11-04 | End: 2024-11-07 | Stop reason: HOSPADM

## 2024-11-03 RX ORDER — LEVOFLOXACIN 750 MG/1
750 TABLET ORAL EVERY 24 HOURS
Status: DISCONTINUED | OUTPATIENT
Start: 2024-11-03 | End: 2024-11-07

## 2024-11-03 RX ADMIN — OXYCODONE HYDROCHLORIDE 5 MG: 5 TABLET ORAL at 21:48

## 2024-11-03 RX ADMIN — LEVETIRACETAM 1250 MG: 500 TABLET, FILM COATED ORAL at 08:09

## 2024-11-03 RX ADMIN — LEVOFLOXACIN 750 MG: 750 TABLET, FILM COATED ORAL at 12:20

## 2024-11-03 RX ADMIN — LEVETIRACETAM 1250 MG: 500 TABLET, FILM COATED ORAL at 21:47

## 2024-11-03 RX ADMIN — APIXABAN 10 MG: 5 TABLET, FILM COATED ORAL at 21:47

## 2024-11-03 RX ADMIN — GABAPENTIN 400 MG: 300 CAPSULE ORAL at 15:11

## 2024-11-03 RX ADMIN — LAMOTRIGINE 250 MG: 100 TABLET ORAL at 21:48

## 2024-11-03 RX ADMIN — PANTOPRAZOLE SODIUM 40 MG: 40 TABLET, DELAYED RELEASE ORAL at 08:09

## 2024-11-03 RX ADMIN — CEFTRIAXONE SODIUM 1 G: 1 INJECTION, SOLUTION INTRAVENOUS at 02:19

## 2024-11-03 RX ADMIN — ASPIRIN 81 MG: 81 TABLET, COATED ORAL at 08:13

## 2024-11-03 RX ADMIN — LAMOTRIGINE 250 MG: 100 TABLET ORAL at 08:11

## 2024-11-03 RX ADMIN — TIZANIDINE 2 MG: 2 TABLET ORAL at 21:48

## 2024-11-03 RX ADMIN — LIDOCAINE 3 PATCH: 4 PATCH TOPICAL at 08:12

## 2024-11-03 RX ADMIN — FENOFIBRATE 54 MG: 54 TABLET, FILM COATED ORAL at 08:12

## 2024-11-03 RX ADMIN — HEPARIN SODIUM 2200 UNITS/HR: 10000 INJECTION, SOLUTION INTRAVENOUS at 08:05

## 2024-11-03 RX ADMIN — CYANOCOBALAMIN TAB 1000 MCG 1000 MCG: 1000 TAB at 08:12

## 2024-11-03 RX ADMIN — GABAPENTIN 400 MG: 300 CAPSULE ORAL at 21:47

## 2024-11-03 RX ADMIN — ATORVASTATIN CALCIUM 40 MG: 40 TABLET, FILM COATED ORAL at 08:11

## 2024-11-03 RX ADMIN — Medication 1000 UNITS: at 08:11

## 2024-11-03 RX ADMIN — OXYCODONE HYDROCHLORIDE 5 MG: 5 TABLET ORAL at 15:11

## 2024-11-03 RX ADMIN — GABAPENTIN 400 MG: 300 CAPSULE ORAL at 08:10

## 2024-11-03 RX ADMIN — STANDARDIZED SENNA CONCENTRATE 17.2 MG: 8.6 TABLET ORAL at 21:47

## 2024-11-03 RX ADMIN — APIXABAN 10 MG: 5 TABLET, FILM COATED ORAL at 10:11

## 2024-11-03 ASSESSMENT — PAIN - FUNCTIONAL ASSESSMENT
PAIN_FUNCTIONAL_ASSESSMENT: 0-10

## 2024-11-03 ASSESSMENT — PAIN SCALES - GENERAL
PAINLEVEL_OUTOF10: 6
PAINLEVEL_OUTOF10: 4
PAINLEVEL_OUTOF10: 8
PAINLEVEL_OUTOF10: 8

## 2024-11-03 ASSESSMENT — COGNITIVE AND FUNCTIONAL STATUS - GENERAL
DRESSING REGULAR LOWER BODY CLOTHING: A LITTLE
MOVING FROM LYING ON BACK TO SITTING ON SIDE OF FLAT BED WITH BEDRAILS: A LOT
MOVING TO AND FROM BED TO CHAIR: TOTAL
HELP NEEDED FOR BATHING: A LOT
CLIMB 3 TO 5 STEPS WITH RAILING: TOTAL
DAILY ACTIVITIY SCORE: 13
EATING MEALS: A LITTLE
TURNING FROM BACK TO SIDE WHILE IN FLAT BAD: A LOT
WALKING IN HOSPITAL ROOM: TOTAL
MOBILITY SCORE: 8
STANDING UP FROM CHAIR USING ARMS: TOTAL
PERSONAL GROOMING: A LOT
DRESSING REGULAR UPPER BODY CLOTHING: A LOT
TOILETING: TOTAL

## 2024-11-03 ASSESSMENT — PAIN DESCRIPTION - LOCATION: LOCATION: SHOULDER

## 2024-11-03 ASSESSMENT — PAIN DESCRIPTION - ORIENTATION: ORIENTATION: LEFT

## 2024-11-03 NOTE — CARE PLAN
The clinical goals for the shift include Patient will remain free from falls, by the end of this shift.    Patient remained free from injuries during this shift.    Patient is in bed, HOB elevated, call light within reach.     Plan of care ongoing.
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
## 2024-11-03 NOTE — CARE PLAN
The patient's goals for the shift include      The clinical goals for the shift include Pt will remain safe and stable during shift

## 2024-11-04 ENCOUNTER — APPOINTMENT (OUTPATIENT)
Dept: VASCULAR MEDICINE | Facility: HOSPITAL | Age: 69
End: 2024-11-04
Payer: OTHER GOVERNMENT

## 2024-11-04 LAB
ALBUMIN SERPL BCP-MCNC: 3.3 G/DL (ref 3.4–5)
ALP SERPL-CCNC: 84 U/L (ref 33–136)
ALT SERPL W P-5'-P-CCNC: 5 U/L (ref 10–52)
ANION GAP SERPL CALC-SCNC: 14 MMOL/L (ref 10–20)
AST SERPL W P-5'-P-CCNC: 12 U/L (ref 9–39)
BASOPHILS # BLD AUTO: 0.07 X10*3/UL (ref 0–0.1)
BASOPHILS NFR BLD AUTO: 0.8 %
BILIRUB SERPL-MCNC: 0.3 MG/DL (ref 0–1.2)
BUN SERPL-MCNC: 13 MG/DL (ref 6–23)
CALCIUM SERPL-MCNC: 9.2 MG/DL (ref 8.6–10.6)
CHLORIDE SERPL-SCNC: 103 MMOL/L (ref 98–107)
CO2 SERPL-SCNC: 23 MMOL/L (ref 21–32)
CREAT SERPL-MCNC: 1.04 MG/DL (ref 0.5–1.3)
EGFRCR SERPLBLD CKD-EPI 2021: 77 ML/MIN/1.73M*2
EOSINOPHIL # BLD AUTO: 0.54 X10*3/UL (ref 0–0.7)
EOSINOPHIL NFR BLD AUTO: 6.1 %
ERYTHROCYTE [DISTWIDTH] IN BLOOD BY AUTOMATED COUNT: 14.3 % (ref 11.5–14.5)
GLUCOSE BLD MANUAL STRIP-MCNC: 115 MG/DL (ref 74–99)
GLUCOSE BLD MANUAL STRIP-MCNC: 127 MG/DL (ref 74–99)
GLUCOSE BLD MANUAL STRIP-MCNC: 130 MG/DL (ref 74–99)
GLUCOSE BLD MANUAL STRIP-MCNC: 135 MG/DL (ref 74–99)
GLUCOSE SERPL-MCNC: 101 MG/DL (ref 74–99)
HCT VFR BLD AUTO: 33.5 % (ref 41–52)
HGB BLD-MCNC: 10.6 G/DL (ref 13.5–17.5)
IMM GRANULOCYTES # BLD AUTO: 0.07 X10*3/UL (ref 0–0.7)
IMM GRANULOCYTES NFR BLD AUTO: 0.8 % (ref 0–0.9)
LYMPHOCYTES # BLD AUTO: 2.15 X10*3/UL (ref 1.2–4.8)
LYMPHOCYTES NFR BLD AUTO: 24.1 %
MAGNESIUM SERPL-MCNC: 2.2 MG/DL (ref 1.6–2.4)
MCH RBC QN AUTO: 30.5 PG (ref 26–34)
MCHC RBC AUTO-ENTMCNC: 31.6 G/DL (ref 32–36)
MCV RBC AUTO: 96 FL (ref 80–100)
MONOCYTES # BLD AUTO: 0.84 X10*3/UL (ref 0.1–1)
MONOCYTES NFR BLD AUTO: 9.4 %
NEUTROPHILS # BLD AUTO: 5.25 X10*3/UL (ref 1.2–7.7)
NEUTROPHILS NFR BLD AUTO: 58.8 %
NRBC BLD-RTO: 0 /100 WBCS (ref 0–0)
PHOSPHATE SERPL-MCNC: 2.9 MG/DL (ref 2.5–4.9)
PLATELET # BLD AUTO: 370 X10*3/UL (ref 150–450)
POTASSIUM SERPL-SCNC: 4.1 MMOL/L (ref 3.5–5.3)
PROT SERPL-MCNC: 6.7 G/DL (ref 6.4–8.2)
RBC # BLD AUTO: 3.48 X10*6/UL (ref 4.5–5.9)
SODIUM SERPL-SCNC: 136 MMOL/L (ref 136–145)
WBC # BLD AUTO: 8.9 X10*3/UL (ref 4.4–11.3)

## 2024-11-04 PROCEDURE — 2500000001 HC RX 250 WO HCPCS SELF ADMINISTERED DRUGS (ALT 637 FOR MEDICARE OP)

## 2024-11-04 PROCEDURE — 83735 ASSAY OF MAGNESIUM: CPT

## 2024-11-04 PROCEDURE — 2500000005 HC RX 250 GENERAL PHARMACY W/O HCPCS

## 2024-11-04 PROCEDURE — 1200000002 HC GENERAL ROOM WITH TELEMETRY DAILY

## 2024-11-04 PROCEDURE — 2500000002 HC RX 250 W HCPCS SELF ADMINISTERED DRUGS (ALT 637 FOR MEDICARE OP, ALT 636 FOR OP/ED)

## 2024-11-04 PROCEDURE — 82947 ASSAY GLUCOSE BLOOD QUANT: CPT

## 2024-11-04 PROCEDURE — 36415 COLL VENOUS BLD VENIPUNCTURE: CPT

## 2024-11-04 PROCEDURE — 93970 EXTREMITY STUDY: CPT

## 2024-11-04 PROCEDURE — 80053 COMPREHEN METABOLIC PANEL: CPT

## 2024-11-04 PROCEDURE — 93970 EXTREMITY STUDY: CPT | Performed by: STUDENT IN AN ORGANIZED HEALTH CARE EDUCATION/TRAINING PROGRAM

## 2024-11-04 PROCEDURE — 99233 SBSQ HOSP IP/OBS HIGH 50: CPT | Performed by: INTERNAL MEDICINE

## 2024-11-04 PROCEDURE — 85025 COMPLETE CBC W/AUTO DIFF WBC: CPT

## 2024-11-04 PROCEDURE — 84100 ASSAY OF PHOSPHORUS: CPT

## 2024-11-04 RX ORDER — VENLAFAXINE HYDROCHLORIDE 150 MG/1
150 CAPSULE, EXTENDED RELEASE ORAL
Status: DISCONTINUED | OUTPATIENT
Start: 2024-11-04 | End: 2024-11-08 | Stop reason: HOSPADM

## 2024-11-04 RX ADMIN — OXYCODONE HYDROCHLORIDE 5 MG: 5 TABLET ORAL at 04:20

## 2024-11-04 RX ADMIN — Medication 1000 UNITS: at 13:56

## 2024-11-04 RX ADMIN — ATORVASTATIN CALCIUM 40 MG: 40 TABLET, FILM COATED ORAL at 13:56

## 2024-11-04 RX ADMIN — GABAPENTIN 400 MG: 300 CAPSULE ORAL at 14:03

## 2024-11-04 RX ADMIN — APIXABAN 10 MG: 5 TABLET, FILM COATED ORAL at 21:30

## 2024-11-04 RX ADMIN — CYANOCOBALAMIN TAB 1000 MCG 1000 MCG: 1000 TAB at 13:57

## 2024-11-04 RX ADMIN — GABAPENTIN 400 MG: 300 CAPSULE ORAL at 21:29

## 2024-11-04 RX ADMIN — TIZANIDINE 2 MG: 2 TABLET ORAL at 21:42

## 2024-11-04 RX ADMIN — LAMOTRIGINE 250 MG: 100 TABLET ORAL at 21:43

## 2024-11-04 RX ADMIN — LEVOFLOXACIN 750 MG: 750 TABLET, FILM COATED ORAL at 13:58

## 2024-11-04 RX ADMIN — ASPIRIN 81 MG: 81 TABLET, COATED ORAL at 13:57

## 2024-11-04 RX ADMIN — LIDOCAINE 3 PATCH: 4 PATCH TOPICAL at 14:06

## 2024-11-04 RX ADMIN — LEVETIRACETAM 1250 MG: 500 TABLET, FILM COATED ORAL at 13:57

## 2024-11-04 RX ADMIN — LEVETIRACETAM 1250 MG: 500 TABLET, FILM COATED ORAL at 21:30

## 2024-11-04 RX ADMIN — STANDARDIZED SENNA CONCENTRATE 17.2 MG: 8.6 TABLET ORAL at 21:29

## 2024-11-04 RX ADMIN — VENLAFAXINE HYDROCHLORIDE 150 MG: 150 CAPSULE, EXTENDED RELEASE ORAL at 18:06

## 2024-11-04 RX ADMIN — OXYCODONE HYDROCHLORIDE 5 MG: 5 TABLET ORAL at 21:43

## 2024-11-04 RX ADMIN — FENOFIBRATE 54 MG: 54 TABLET, FILM COATED ORAL at 13:58

## 2024-11-04 RX ADMIN — PANTOPRAZOLE SODIUM 40 MG: 40 TABLET, DELAYED RELEASE ORAL at 06:24

## 2024-11-04 RX ADMIN — APIXABAN 10 MG: 5 TABLET, FILM COATED ORAL at 13:56

## 2024-11-04 RX ADMIN — OXYCODONE HYDROCHLORIDE 5 MG: 5 TABLET ORAL at 13:55

## 2024-11-04 ASSESSMENT — COGNITIVE AND FUNCTIONAL STATUS - GENERAL
MOBILITY SCORE: 8
MOVING FROM LYING ON BACK TO SITTING ON SIDE OF FLAT BED WITH BEDRAILS: A LOT
DAILY ACTIVITIY SCORE: 13
DRESSING REGULAR UPPER BODY CLOTHING: A LOT
DRESSING REGULAR LOWER BODY CLOTHING: A LITTLE
STANDING UP FROM CHAIR USING ARMS: TOTAL
DRESSING REGULAR UPPER BODY CLOTHING: A LOT
PERSONAL GROOMING: A LOT
TURNING FROM BACK TO SIDE WHILE IN FLAT BAD: A LOT
EATING MEALS: A LITTLE
TOILETING: TOTAL
DAILY ACTIVITIY SCORE: 13
CLIMB 3 TO 5 STEPS WITH RAILING: TOTAL
CLIMB 3 TO 5 STEPS WITH RAILING: TOTAL
MOVING TO AND FROM BED TO CHAIR: TOTAL
PERSONAL GROOMING: A LOT
EATING MEALS: A LITTLE
HELP NEEDED FOR BATHING: A LOT
STANDING UP FROM CHAIR USING ARMS: TOTAL
HELP NEEDED FOR BATHING: A LOT
DRESSING REGULAR LOWER BODY CLOTHING: A LITTLE
MOVING FROM LYING ON BACK TO SITTING ON SIDE OF FLAT BED WITH BEDRAILS: A LOT
TOILETING: TOTAL
WALKING IN HOSPITAL ROOM: TOTAL
WALKING IN HOSPITAL ROOM: TOTAL
TURNING FROM BACK TO SIDE WHILE IN FLAT BAD: A LOT
MOVING TO AND FROM BED TO CHAIR: TOTAL
MOBILITY SCORE: 8

## 2024-11-04 ASSESSMENT — PAIN SCALES - GENERAL
PAINLEVEL_OUTOF10: 0 - NO PAIN
PAINLEVEL_OUTOF10: 3
PAINLEVEL_OUTOF10: 7
PAINLEVEL_OUTOF10: 8

## 2024-11-04 ASSESSMENT — PAIN - FUNCTIONAL ASSESSMENT
PAIN_FUNCTIONAL_ASSESSMENT: 0-10

## 2024-11-04 ASSESSMENT — PAIN DESCRIPTION - LOCATION: LOCATION: SHOULDER

## 2024-11-04 ASSESSMENT — PAIN DESCRIPTION - ORIENTATION: ORIENTATION: LEFT

## 2024-11-04 ASSESSMENT — PAIN SCALES - WONG BAKER: WONGBAKER_NUMERICALRESPONSE: HURTS WHOLE LOT
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
## 2024-11-04 NOTE — RESEARCH NOTES
Artificial Intelligence Monitoring in Nursing (AIMS Nursing) Study    Principle Investigator - Dr. Huber Hess  Research Coordinator - Adi Norris RN     Patient Name - Nino Crocker  Date - 11/4/2024 2:56 PM  Location - [address] was approached by Adi Norris RN to talk about participating in the AIMS Nursing Study. The patient was not able to be approached, a research coordinator will come back at a later time. Study protocol was followed and patient was given study contact information.     Adi Norris RN

## 2024-11-04 NOTE — PROGRESS NOTES
Occupational Therapy                 Therapy Communication Note    Patient Name: Nino Crocker  MRN: [id number]  Department[place] [facility] [place] [id number] [place]ASC[place]  Room: [address] [place], [place] [address]-  Today's Date: 11/4/2024     Discipline: Occupational Therapy    Missed Visit Reason: Missed Visit Reason:  (vascular)    Missed Time: Attempt    Comment:

## 2024-11-04 NOTE — PROGRESS NOTES
Nino Crocker is a 69 y.o. male on day 4 of admission presenting with Acute saddle pulmonary embolism, unspecified whether acute cor pulmonale present (Multi).    DOUG consulted on 11/4/24 to [facility].   DOUG informed by med team that pt only has [facility] benefits.    SW called [facility] transfer line ([phone]) to initiate transfer. SW had to leave a .    DOUG sent clinicals via fax to [phone].    UPDATE: DOUG spoke with Sandeep, [facility] coordinator ([phone]) and was asked to send updates via fax to the above fax #. Sandeep asked current dc plan for pt, and said he would follow up with DOUG on 11/5.    SW to follow.    Pt adod: 11/6/24    Diana Phillips MSW [facility]  Care Transitions  2  [phone] or Epic Secure Chat

## 2024-11-04 NOTE — CARE PLAN
Problem: Pain - Adult  Goal: Verbalizes/displays adequate comfort level or baseline comfort level  Outcome: Progressing     Problem: Safety - Adult  Goal: Free from fall injury  Outcome: Progressing     Problem: Discharge Planning  Goal: Discharge to home or other facility with appropriate resources  Outcome: Progressing     Problem: Chronic Conditions and Co-morbidities  Goal: Patient's chronic conditions and co-morbidity symptoms are monitored and maintained or improved  Outcome: Progressing   The patient's goals for the shift include      The clinical goals for the shift include Will remain HDS during shift.
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
## 2024-11-04 NOTE — PROGRESS NOTES
Physical Therapy                 Therapy Communication Note    Patient Name: Nino Crocker  MRN: [id number]  Department: [place] [address]  Room: [address] [place] [address]  Today's Date: 11/4/2024     Discipline: Physical Therapy    Missed Visit Reason: Patient refused (Pt declining PT eval at this time. RN present and aware. Pt adamant on returning to [facility]).)    Missed Time: Attempt

## 2024-11-04 NOTE — PROGRESS NOTES
Christina Crocker is a 69 y.o. male on hospital day 4 who is doing well the time I saw him this afternoon.  Denies chest pain but does note lower extremity swelling.  Patient's wife had raised concerns that he was more irritable recently and suspects it may be because he is withdrawing from his Effexor.    Objective     Exam     Vitals:    11/04/24 0412 11/04/24 0600 11/04/24 0752 11/04/24 1133   BP: 140/68  136/64 97/62   Pulse: 91  84 97   Resp:   16 16   Temp: 36.7 °C (98.1 °F)  36.5 °C (97.7 °F) 36.6 °C (97.9 °F)   TempSrc:       SpO2: 92%  94% 97%   Weight:  119 kg (262 lb 2 oz)     Height:          Intake/Output last 3 shifts:  I/O last 3 completed shifts:  In: 290 (2.4 mL/kg) [P.O.:240; IV Piggyback:50]  Out: 1950 (16.4 mL/kg) [Urine:1950 (0.5 mL/kg/hr)]  Weight: 118.9 kg     Physical Exam  Vitals reviewed.   Constitutional:       General: He is not in acute distress.     Appearance: He is obese. He is not ill-appearing, toxic-appearing or diaphoretic.   HENT:      Head: Normocephalic and atraumatic.      Mouth/Throat:      Mouth: Mucous membranes are moist.   Cardiovascular:      Rate and Rhythm: Normal rate and regular rhythm.      Pulses: Normal pulses.      Heart sounds: No murmur heard.     No friction rub. No gallop.   Pulmonary:      Effort: Pulmonary effort is normal.      Breath sounds: Normal breath sounds. No wheezing, rhonchi or rales.      Comments: Anteriorly  Abdominal:      General: Bowel sounds are normal. There is no distension.      Palpations: Abdomen is soft.      Tenderness: There is no abdominal tenderness. There is no guarding or rebound.   Musculoskeletal:      Comments: 1+ bilateral lower extremity edema   Skin:     General: Skin is warm and dry.   Neurological:      Mental Status: He is alert and oriented to person, place, and time.      Comments: Paraplegic   Psychiatric:         Mood and Affect: Mood normal.         Behavior: Behavior normal.      Comments: Was very  pleasant and conversational when I met patient.            Medications   apixaban, 10 mg, oral, BID   Followed by  [START ON 11/10/2024] apixaban, 5 mg, oral, BID  aspirin, 81 mg, oral, Daily  atorvastatin, 40 mg, oral, Daily  [START ON 11/5/2024] bisacodyl, 10 mg, oral, Every Tues/Thur  cholecalciferol, 1,000 Units, oral, Daily  cyanocobalamin, 1,000 mcg, oral, Daily  fenofibrate, 54 mg, oral, Daily  gabapentin, 400 mg, oral, TID  insulin lispro, 0-5 Units, subcutaneous, TID  lamoTRIgine, 250 mg, oral, BID  levETIRAcetam, 1,250 mg, oral, BID  levoFLOXacin, 750 mg, oral, q24h  lidocaine, 3 patch, transdermal, Daily  pantoprazole, 40 mg, oral, Daily before breakfast  sennosides, 2 tablet, oral, BID  venlafaxine XR, 150 mg, oral, Daily with breakfast       PRN medications: acetaminophen **OR** acetaminophen, dextrose, dextrose, glucagon, glucagon, oxyCODONE, tiZANidine       Labs     All new labs reviewed:  some of the basic labs as follows -     Results from last 7 days   Lab Units 11/04/24  0709 11/03/24 0750 11/02/24  0743   WBC AUTO x10*3/uL 8.9 8.3 8.5   HEMOGLOBIN g/dL 10.6* 11.0* 11.0*   HEMATOCRIT % 33.5* 34.0* 33.8*   PLATELETS AUTO x10*3/uL 370 343 327   NEUTROS PCT AUTO % 58.8 50.4 52.2   LYMPHS PCT AUTO % 24.1 31.1 31.8   MONOS PCT AUTO % 9.4 9.5 8.8   EOS PCT AUTO % 6.1 7.7 5.9          Results from last 72 hours   Lab Units 11/04/24  0709 11/03/24  0750 11/02/24  0743   SODIUM mmol/L 136 137 138   POTASSIUM mmol/L 4.1 4.2 3.7   CHLORIDE mmol/L 103 104 104   CO2 mmol/L 23 23 22   BUN mg/dL 13 12 13   CREATININE mg/dL 1.04 1.15 1.14     Results from last 72 hours   Lab Units 11/04/24  0709 11/03/24  0750 11/02/24  0743   ALK PHOS U/L 84 84 82   AST U/L 12 12 11   ALT U/L 5* 7* 4*   BILIRUBIN TOTAL mg/dL 0.3 0.3 0.4   ALBUMIN g/dL 3.3* 3.3* 3.4   PROTEIN TOTAL g/dL 6.7 6.3* 6.7     Results from last 72 hours   Lab Units 11/04/24  0709 11/03/24  0750 11/02/24  0743   GLUCOSE mg/dL 101* 114* 115*         No  "results found for: \"TR1\"  No results found for: \"URINECULTURE\", \"BLOODCULT\"         Imaging   Vascular US Lower Extremity Venous Duplex Bilateral              [place] [address]    Tel [phone] and Fax [phone]       Vascular Lab Report  VASC US LOWER EXTREMITY VENOUS DUPLEX BILATERAL       Patient Name:      JEANNA OTTO      Reading Physician:  [id number] Shonda Ernandez MD  Study Date:        11/4/2024            Ordering Physician: [id number] DAVIS  MRN/PID:           [id number]             Technologist:       Abilio Ramirez RVT  Accession#:        [id number]         Technologist 2:  Date of Birth/Age: 1955 / 69 years Encounter#:         [id number]  Gender:            M  Admission Status:  Inpatient            Location Performed: [facility]       Diagnosis/ICD: Other pulmonary embolism without acute cor pulmonale-I26.99  Indication:    Pulmonary Embolism  CPT Codes:     01827 Peripheral venous duplex scan for DVT complete       **CRITICAL RESULT**  Critical Result: Occlusive thrombus noted in bilateral legs as noted below.  Notification called to Agus Garner MD and Caitlin Ibarra MD on 11/4/2024 at 12:41:26 PM by Abilio Ramirez RVT.     CONCLUSIONS:  Right Lower Venous: There is acute occlusive deep vein thrombosis visualized in the proximal femoral, mid femoral, distal femoral and popliteal veins. Cannot rule out thrombus in non-visualized posterior tibial and Peroneal veins due to swelling. The remainder of the right leg is negative for deep vein thrombosis.  Left Lower Venous: There is acute occlusive deep vein thrombosis visualized in the common femoral, profunda, proximal femoral, mid femoral, distal femoral and popliteal veins. Cannot rule out thrombus in non-visualized posterior tibial and peroneal veins due to swelling. The remainder of the left leg is " negative for deep vein thrombosis.     Imaging & Doppler Findings:     Right                 Compressible    Thrombus            Flow  Distal External Iliac                   None       Spontaneous/Phasic  CFV                       Yes           None       Spontaneous/Phasic  PFV                       Yes           None  FV Proximal                No      Acute occlusive        None  FV Mid                     No      Acute occlusive        None  FV Distal                  No      Acute occlusive        None  Popliteal                  No      Acute occlusive        None       Left                  Compress    Thrombus        Flow  Distal External Iliac               None       Continuous  CFV                      No    Acute occlusive    None  PFV                      No    Acute occlusive    None  FV Proximal              No    Acute occlusive    None  FV Mid                   No    Acute occlusive    None  FV Distal                No    Acute occlusive    None  Popliteal                No    Acute occlusive    None       [id number] Shonda Ernandez MD  Electronically signed by [id number] Shonda Ernandez MD on 11/4/2024 at 1:46:28 PM       ** Final **     No results found for this or any previous visit from the past 1095 days.     Encounter Date: 10/31/24   Electrocardiogram, 12-lead PRN ACS symptoms   Result Value    Ventricular Rate 98    Atrial Rate 98    MD Interval 174    QRS Duration 112    QT Interval 368    QTC Calculation(Bazett) 469    P Axis 36    R Axis 59    T Axis 89    QRS Count 16    Q Onset 223    P Onset 136    P Offset 197    T Offset 407    QTC Fredericia 433    Narrative    Normal sinus rhythm  Incomplete right bundle branch block  Cannot rule out Anteroseptal infarct , age undetermined  Abnormal ECG  No previous ECGs available          Assessment and Plan     Extensive DVTs with PE: Echo showed right heart strain.  Patient at risk given his immobility.  He noted his left leg initially  started with the swelling a few weeks back.  It was not until later that the right leg became swollen.  Of note patient did have surgery however this was approximately 4 months ago  -Continue anticoagulation/DOAC.  Discussed with patient and his wife things to monitor for including GI bleed including description of melena to watch for  -Follow-up vascular medicine    UTI: Urine culture mixed growth  -Continue levofloxacin based on previous urine culture results    Psychiatry:  -Resume home Effexor    Acute renal failure: Resolved.  Creatinine down to 1.04  -Continue to monitor while inpatient    Cardiovascular: Right heart strain on echo from PE.  Blood pressure has been low to marginal with most recent recording at 97/62  -Holding vasoactive meds (losartan, amlodipine, Lasix)  -Continue aspirin, statin, fenofibrate    CNS: Seizure disorder and paraplegia  -Continue home Keppra and lamotrigine  -Pain control with bowel care    Diabetes mellitus:  -Continue sliding scale insulin  -May resume metformin on discharge    Gout:  -Can resume allopurinol    GI:  -Continue PPI  -Continue bowel care    DVT prophylaxis covered by DOAC    Physical therapy/Occupational Therapy     Patient and wife requesting transfer back to [facility].  [facility] as they were not pleased with previous rehab because they did not engage patient enough    Kraig Anaya MD     Of note the above was done with Dragon dictation system.  Note was proofread to minimize errors.

## 2024-11-05 LAB
ALBUMIN SERPL BCP-MCNC: 3.9 G/DL (ref 3.4–5)
ALP SERPL-CCNC: 93 U/L (ref 33–136)
ALT SERPL W P-5'-P-CCNC: 4 U/L (ref 10–52)
ANION GAP SERPL CALC-SCNC: 15 MMOL/L (ref 10–20)
AST SERPL W P-5'-P-CCNC: 19 U/L (ref 9–39)
BASOPHILS # BLD AUTO: 0.12 X10*3/UL (ref 0–0.1)
BASOPHILS NFR BLD AUTO: 1.4 %
BILIRUB SERPL-MCNC: 0.5 MG/DL (ref 0–1.2)
BUN SERPL-MCNC: 17 MG/DL (ref 6–23)
CALCIUM SERPL-MCNC: 10 MG/DL (ref 8.6–10.6)
CHLORIDE SERPL-SCNC: 101 MMOL/L (ref 98–107)
CO2 SERPL-SCNC: 23 MMOL/L (ref 21–32)
CREAT SERPL-MCNC: 1.19 MG/DL (ref 0.5–1.3)
EGFRCR SERPLBLD CKD-EPI 2021: 66 ML/MIN/1.73M*2
EOSINOPHIL # BLD AUTO: 0.47 X10*3/UL (ref 0–0.7)
EOSINOPHIL NFR BLD AUTO: 5.4 %
ERYTHROCYTE [DISTWIDTH] IN BLOOD BY AUTOMATED COUNT: 14.5 % (ref 11.5–14.5)
GLUCOSE BLD MANUAL STRIP-MCNC: 106 MG/DL (ref 74–99)
GLUCOSE BLD MANUAL STRIP-MCNC: 127 MG/DL (ref 74–99)
GLUCOSE BLD MANUAL STRIP-MCNC: 128 MG/DL (ref 74–99)
GLUCOSE SERPL-MCNC: 107 MG/DL (ref 74–99)
HCT VFR BLD AUTO: 38.9 % (ref 41–52)
HGB BLD-MCNC: 12.3 G/DL (ref 13.5–17.5)
IMM GRANULOCYTES # BLD AUTO: 0.09 X10*3/UL (ref 0–0.7)
IMM GRANULOCYTES NFR BLD AUTO: 1 % (ref 0–0.9)
LYMPHOCYTES # BLD AUTO: 2.17 X10*3/UL (ref 1.2–4.8)
LYMPHOCYTES NFR BLD AUTO: 24.7 %
MAGNESIUM SERPL-MCNC: 2.53 MG/DL (ref 1.6–2.4)
MCH RBC QN AUTO: 30.4 PG (ref 26–34)
MCHC RBC AUTO-ENTMCNC: 31.6 G/DL (ref 32–36)
MCV RBC AUTO: 96 FL (ref 80–100)
MONOCYTES # BLD AUTO: 0.66 X10*3/UL (ref 0.1–1)
MONOCYTES NFR BLD AUTO: 7.5 %
NEUTROPHILS # BLD AUTO: 5.26 X10*3/UL (ref 1.2–7.7)
NEUTROPHILS NFR BLD AUTO: 60 %
NRBC BLD-RTO: 0 /100 WBCS (ref 0–0)
PHOSPHATE SERPL-MCNC: 3.5 MG/DL (ref 2.5–4.9)
PLATELET # BLD AUTO: 379 X10*3/UL (ref 150–450)
POTASSIUM SERPL-SCNC: 4.9 MMOL/L (ref 3.5–5.3)
PROT SERPL-MCNC: 8.1 G/DL (ref 6.4–8.2)
RBC # BLD AUTO: 4.04 X10*6/UL (ref 4.5–5.9)
SODIUM SERPL-SCNC: 134 MMOL/L (ref 136–145)
WBC # BLD AUTO: 8.8 X10*3/UL (ref 4.4–11.3)

## 2024-11-05 PROCEDURE — 2500000001 HC RX 250 WO HCPCS SELF ADMINISTERED DRUGS (ALT 637 FOR MEDICARE OP)

## 2024-11-05 PROCEDURE — 83735 ASSAY OF MAGNESIUM: CPT

## 2024-11-05 PROCEDURE — 2500000005 HC RX 250 GENERAL PHARMACY W/O HCPCS

## 2024-11-05 PROCEDURE — 1100000001 HC PRIVATE ROOM DAILY

## 2024-11-05 PROCEDURE — 82947 ASSAY GLUCOSE BLOOD QUANT: CPT

## 2024-11-05 PROCEDURE — 85025 COMPLETE CBC W/AUTO DIFF WBC: CPT

## 2024-11-05 PROCEDURE — 84100 ASSAY OF PHOSPHORUS: CPT

## 2024-11-05 PROCEDURE — 99232 SBSQ HOSP IP/OBS MODERATE 35: CPT | Performed by: INTERNAL MEDICINE

## 2024-11-05 PROCEDURE — 36415 COLL VENOUS BLD VENIPUNCTURE: CPT

## 2024-11-05 PROCEDURE — 80053 COMPREHEN METABOLIC PANEL: CPT

## 2024-11-05 RX ORDER — DICLOFENAC SODIUM 10 MG/G
4 GEL TOPICAL EVERY 6 HOURS PRN
COMMUNITY
End: 2024-11-08 | Stop reason: HOSPADM

## 2024-11-05 RX ORDER — LIDOCAINE 560 MG/1
3 PATCH PERCUTANEOUS; TOPICAL; TRANSDERMAL DAILY
COMMUNITY

## 2024-11-05 RX ORDER — ALLOPURINOL 300 MG/1
300 TABLET ORAL DAILY
Status: DISCONTINUED | OUTPATIENT
Start: 2024-11-05 | End: 2024-11-08 | Stop reason: HOSPADM

## 2024-11-05 RX ORDER — VIT C/E/ZN/COPPR/LUTEIN/ZEAXAN 250MG-90MG
25 CAPSULE ORAL DAILY
COMMUNITY

## 2024-11-05 RX ADMIN — CYANOCOBALAMIN TAB 1000 MCG 1000 MCG: 1000 TAB at 09:09

## 2024-11-05 RX ADMIN — LAMOTRIGINE 250 MG: 100 TABLET ORAL at 09:09

## 2024-11-05 RX ADMIN — GABAPENTIN 400 MG: 300 CAPSULE ORAL at 20:53

## 2024-11-05 RX ADMIN — ALLOPURINOL 300 MG: 300 TABLET ORAL at 14:01

## 2024-11-05 RX ADMIN — APIXABAN 10 MG: 5 TABLET, FILM COATED ORAL at 20:53

## 2024-11-05 RX ADMIN — STANDARDIZED SENNA CONCENTRATE 17.2 MG: 8.6 TABLET ORAL at 09:08

## 2024-11-05 RX ADMIN — LEVETIRACETAM 1250 MG: 500 TABLET, FILM COATED ORAL at 09:08

## 2024-11-05 RX ADMIN — ASPIRIN 81 MG: 81 TABLET, COATED ORAL at 09:08

## 2024-11-05 RX ADMIN — PANTOPRAZOLE SODIUM 40 MG: 40 TABLET, DELAYED RELEASE ORAL at 06:22

## 2024-11-05 RX ADMIN — APIXABAN 10 MG: 5 TABLET, FILM COATED ORAL at 09:09

## 2024-11-05 RX ADMIN — VENLAFAXINE HYDROCHLORIDE 150 MG: 150 CAPSULE, EXTENDED RELEASE ORAL at 09:08

## 2024-11-05 RX ADMIN — Medication 1000 UNITS: at 09:20

## 2024-11-05 RX ADMIN — LIDOCAINE 3 PATCH: 4 PATCH TOPICAL at 09:10

## 2024-11-05 RX ADMIN — LEVOFLOXACIN 750 MG: 750 TABLET, FILM COATED ORAL at 09:09

## 2024-11-05 RX ADMIN — OXYCODONE HYDROCHLORIDE 5 MG: 5 TABLET ORAL at 04:08

## 2024-11-05 RX ADMIN — ATORVASTATIN CALCIUM 40 MG: 40 TABLET, FILM COATED ORAL at 09:09

## 2024-11-05 RX ADMIN — GABAPENTIN 400 MG: 300 CAPSULE ORAL at 14:01

## 2024-11-05 RX ADMIN — LEVETIRACETAM 1250 MG: 500 TABLET, FILM COATED ORAL at 20:53

## 2024-11-05 RX ADMIN — OXYCODONE HYDROCHLORIDE 5 MG: 5 TABLET ORAL at 14:01

## 2024-11-05 RX ADMIN — FENOFIBRATE 54 MG: 54 TABLET, FILM COATED ORAL at 09:09

## 2024-11-05 RX ADMIN — LAMOTRIGINE 250 MG: 100 TABLET ORAL at 20:54

## 2024-11-05 RX ADMIN — GABAPENTIN 400 MG: 300 CAPSULE ORAL at 09:09

## 2024-11-05 ASSESSMENT — PAIN DESCRIPTION - LOCATION: LOCATION: SHOULDER

## 2024-11-05 ASSESSMENT — PAIN SCALES - GENERAL
PAINLEVEL_OUTOF10: 7
PAINLEVEL_OUTOF10: 0 - NO PAIN
PAINLEVEL_OUTOF10: 0 - NO PAIN
PAINLEVEL_OUTOF10: 8

## 2024-11-05 ASSESSMENT — PAIN - FUNCTIONAL ASSESSMENT
PAIN_FUNCTIONAL_ASSESSMENT: 0-10

## 2024-11-05 NOTE — PROGRESS NOTES
Christina Crocker is a 69 y.o. male on hospital day 5 who is doing well this morning when I saw him.  Is without complaints    Objective     Exam     Vitals:    11/05/24 0000 11/05/24 0449 11/05/24 0600 11/05/24 0756   BP: 132/72 120/63  120/66   Pulse: 96 100  92   Resp: 18 17  18   Temp: 36.7 °C (98.1 °F) 36.6 °C (97.9 °F)  36.5 °C (97.7 °F)   TempSrc:       SpO2: 93% 95%  94%   Weight:   119 kg (262 lb 5.6 oz)    Height:          Intake/Output last 3 shifts:  I/O last 3 completed shifts:  In: 240 (2 mL/kg) [P.O.:240]  Out: 1600 (13.4 mL/kg) [Urine:1600 (0.4 mL/kg/hr)]  Weight: 119 kg     Physical Exam  Vitals reviewed.   Constitutional:       General: He is not in acute distress.     Appearance: He is obese. He is not ill-appearing, toxic-appearing or diaphoretic.   HENT:      Head: Normocephalic and atraumatic.      Mouth/Throat:      Mouth: Mucous membranes are moist.   Cardiovascular:      Rate and Rhythm: Normal rate and regular rhythm.      Pulses: Normal pulses.      Heart sounds: No murmur heard.     No friction rub. No gallop.   Pulmonary:      Effort: Pulmonary effort is normal.      Breath sounds: Normal breath sounds. No wheezing, rhonchi or rales.      Comments: Anteriorly  Abdominal:      General: Bowel sounds are normal. There is no distension.      Palpations: Abdomen is soft.      Tenderness: There is no abdominal tenderness. There is no guarding or rebound.   Musculoskeletal:      Comments: 1+ bilateral lower extremity edema   Skin:     General: Skin is warm and dry.   Neurological:      Mental Status: He is alert and oriented to person, place, and time.      Comments: Paraplegic   Psychiatric:         Mood and Affect: Mood normal.         Behavior: Behavior normal.      Comments: Was very pleasant after I woke him up.            Medications   apixaban, 10 mg, oral, BID   Followed by  [START ON 11/10/2024] apixaban, 5 mg, oral, BID  aspirin, 81 mg, oral, Daily  atorvastatin, 40 mg,  "oral, Daily  bisacodyl, 10 mg, oral, Every Tues/Thur  cholecalciferol, 1,000 Units, oral, Daily  cyanocobalamin, 1,000 mcg, oral, Daily  fenofibrate, 54 mg, oral, Daily  gabapentin, 400 mg, oral, TID  insulin lispro, 0-5 Units, subcutaneous, TID  lamoTRIgine, 250 mg, oral, BID  levETIRAcetam, 1,250 mg, oral, BID  levoFLOXacin, 750 mg, oral, q24h  lidocaine, 3 patch, transdermal, Daily  pantoprazole, 40 mg, oral, Daily before breakfast  sennosides, 2 tablet, oral, BID  venlafaxine XR, 150 mg, oral, Daily with breakfast       PRN medications: acetaminophen **OR** acetaminophen, dextrose, dextrose, glucagon, glucagon, oxyCODONE, tiZANidine       Labs     All new labs reviewed:  some of the basic labs as follows -     Results from last 7 days   Lab Units 11/05/24  0812 11/04/24 0709 11/03/24  0750   WBC AUTO x10*3/uL 8.8 8.9 8.3   HEMOGLOBIN g/dL 12.3* 10.6* 11.0*   HEMATOCRIT % 38.9* 33.5* 34.0*   PLATELETS AUTO x10*3/uL 379 370 343   NEUTROS PCT AUTO % 60.0 58.8 50.4   LYMPHS PCT AUTO % 24.7 24.1 31.1   MONOS PCT AUTO % 7.5 9.4 9.5   EOS PCT AUTO % 5.4 6.1 7.7          Results from last 72 hours   Lab Units 11/05/24  0812 11/04/24  0709 11/03/24  0750   SODIUM mmol/L 134* 136 137   POTASSIUM mmol/L 4.9 4.1 4.2   CHLORIDE mmol/L 101 103 104   CO2 mmol/L 23 23 23   BUN mg/dL 17 13 12   CREATININE mg/dL 1.19 1.04 1.15     Results from last 72 hours   Lab Units 11/05/24  0812 11/04/24  0709 11/03/24  0750   ALK PHOS U/L 93 84 84   AST U/L 19 12 12   ALT U/L 4* 5* 7*   BILIRUBIN TOTAL mg/dL 0.5 0.3 0.3   ALBUMIN g/dL 3.9 3.3* 3.3*   PROTEIN TOTAL g/dL 8.1 6.7 6.3*     Results from last 72 hours   Lab Units 11/05/24  0812 11/04/24  0709 11/03/24  0750   GLUCOSE mg/dL 107* 101* 114*         No results found for: \"TR1\"  No results found for: \"URINECULTURE\", \"BLOODCULT\"         Imaging   Vascular US Lower Extremity Venous Duplex Bilateral              [place] [address]    Tel " [phone] and Fax [phone]       Vascular Lab Report  [facility] US LOWER EXTREMITY VENOUS DUPLEX BILATERAL       Patient Name:      JEANNA OTTO      Reading Physician:  [id number] Shonda Ernandez MD  Study Date:        11/4/2024            Ordering Physician: [id number] TARA SUSAN  MRN/PID:           [id number]             Technologist:       Abilio Ramirez RVT  Accession#:        [id number]         Technologist 2:  Date of Birth/Age: 1955 / 69 years Encounter#:         [id number]  Gender:            M  Admission Status:  Inpatient            Location Performed: [facility]       Diagnosis/ICD: Other pulmonary embolism without acute cor pulmonale-I26.99  Indication:    Pulmonary Embolism  CPT Codes:     64017 Peripheral venous duplex scan for DVT complete       **CRITICAL RESULT**  Critical Result: Occlusive thrombus noted in bilateral legs as noted below.  Notification called to Agus Garner MD and Caitlin Ibarra MD on 11/4/2024 at 12:41:26 PM by Abilio Ramirez RVT.     CONCLUSIONS:  Right Lower Venous: There is acute occlusive deep vein thrombosis visualized in the proximal femoral, mid femoral, distal femoral and popliteal veins. Cannot rule out thrombus in non-visualized posterior tibial and Peroneal veins due to swelling. The remainder of the right leg is negative for deep vein thrombosis.  Left Lower Venous: There is acute occlusive deep vein thrombosis visualized in the common femoral, profunda, proximal femoral, mid femoral, distal femoral and popliteal veins. Cannot rule out thrombus in non-visualized posterior tibial and peroneal veins due to swelling. The remainder of the left leg is negative for deep vein thrombosis.     Imaging & Doppler Findings:     Right                 Compressible    Thrombus            Flow  Distal External Iliac                   None       Spontaneous/Phasic  CFV                       Yes            None       Spontaneous/Phasic  PFV                       Yes           None  FV Proximal                No      Acute occlusive        None  FV Mid                     No      Acute occlusive        None  FV Distal                  No      Acute occlusive        None  Popliteal                  No      Acute occlusive        None       Left                  Compress    Thrombus        Flow  Distal External Iliac               None       Continuous  CFV                      No    Acute occlusive    None  PFV                      No    Acute occlusive    None  FV Proximal              No    Acute occlusive    None  FV Mid                   No    Acute occlusive    None  FV Distal                No    Acute occlusive    None  Popliteal                No    Acute occlusive    None       [id number] Shonda Ernandez MD  Electronically signed by [id number] Shonda Ernandez MD on 11/4/2024 at 1:46:28 PM       ** Final **     No results found for this or any previous visit from the past 1095 days.     Encounter Date: 10/31/24   Electrocardiogram, 12-lead PRN ACS symptoms   Result Value    Ventricular Rate 98    Atrial Rate 98    SD Interval 174    QRS Duration 112    QT Interval 368    QTC Calculation(Bazett) 469    P Axis 36    R Axis 59    T Axis 89    QRS Count 16    Q Onset 223    P Onset 136    P Offset 197    T Offset 407    QTC Fredericia 433    Narrative    Normal sinus rhythm  Incomplete right bundle branch block  Cannot rule out Anteroseptal infarct , age undetermined  Abnormal ECG  No previous ECGs available          Assessment and Plan     Extensive DVTs with PE: Echo showed right heart strain.  Patient at risk given his immobility.  He noted his left leg initially started with the swelling a few weeks back.  It was not until later that the right leg became swollen.  Of note patient did have surgery however this was approximately 4 months ago.  Of note hemoglobin jumped today to 12.3 from 10.6.   Would not be surprised we see this come back down tomorrow which would not necessarily indicate any blood loss  -Continue anticoagulation/DOAC.    -Follow-up vascular medicine  -Telemetry without significant events.  Only showed artifact.  Can DC telemetry    UTI: Urine culture mixed growth  -Complete planned levofloxacin based on previous urine culture results    Psychiatry:  -Continue home Effexor    Acute renal failure: Resolved.  Creatinine remains roughly stable at approximately 1-1.2  -Continue to monitor while inpatient    Cardiovascular: Right heart strain on echo from PE.  Blood pressure has been low to marginal with most recent recording at 97/62  -Holding vasoactive meds (losartan, amlodipine, Lasix)  -Continue aspirin, statin, fenofibrate    CNS: Seizure disorder and paraplegia  -Continue home Keppra and lamotrigine  -Pain control with bowel care    Diabetes mellitus:  -Continue sliding scale insulin  -May resume metformin on discharge    Gout:  -resume allopurinol    GI:  -Continue PPI  -Continue bowel care    DVT prophylaxis covered by DOAC    Physical therapy/Occupational Therapy     Patient and wife requesting transfer back to [facility].  [facility] need for new disposition as they were not pleased with previous rehab because they did not engage patient enough    Kraig Anaya MD     Of note the above was done with Dragon dictation system.  Note was proofread to minimize errors.

## 2024-11-05 NOTE — PROGRESS NOTES
Nino Crocker is a 69 y.o. male on day 5 of admission presenting with Acute saddle pulmonary embolism, unspecified whether acute cor pulmonale present (Multi).    DOUG consulted on 11/4/24 to assist with [facility] to [facility].     DOUG called Sandeep, [facility] coordinator ([phone]), to ask for an update regarding pt's transfer. Sandeep did not answer, DOUG left .    DOUG was able to connect with Sandeep via phone call, and Sandeep stated the pt cannot [facility] to [facility] today. Sandeep asked for a phone call tomorrow morning 11/6. DOUG told him she would call him then and provide updates.    DOUG updated team.    DOUG will follow.    Pt adod: 11/6/24   Current dc plan: NORBERTO Phillips MSW [facility]  Care Transitions  2  [phone] or Epic Secure Chat

## 2024-11-05 NOTE — PROGRESS NOTES
Nino Crocker is a 69 y.o. male on day 5 of admission presenting with Acute saddle pulmonary embolism, unspecified whether acute cor pulmonale present (Multi).      Subjective   Patient feels ok today, some sourness when rolling over his left shoulder. No chest pain, no SOB.        Objective     Last Recorded Vitals  /66   Pulse 92   Temp 36.5 °C (97.7 °F)   Resp 18   Wt 119 kg (262 lb 5.6 oz)   SpO2 94%   Intake/Output last 3 Shifts:  No intake or output data in the 24 hours ending 11/05/24 0824    Admission Weight  Weight: 117 kg (257 lb 15 oz) (11/01/24 0000)    Daily Weight  11/05/24 : 119 kg (262 lb 5.6 oz)    Image Results  Vascular US Lower Extremity Venous Duplex Bilateral              [place] [address]    Tel [phone] and Fax [phone]       Vascular Lab Report  VASC US LOWER EXTREMITY VENOUS DUPLEX BILATERAL       Patient Name:      NINO CROCKER      Reading Physician:  [id number] Shonda Ernandez MD  Study Date:        11/4/2024            Ordering Physician: [id number] DAVIS  MRN/PID:           [id number]             Technologist:       Abilio Ramirez RVT  Accession#:        [id number]         Technologist 2:  Date of Birth/Age: 1955 / 69 years Encounter#:         [id number]  Gender:            M  Admission Status:  Inpatient            Location Performed: [facility]       Diagnosis/ICD: Other pulmonary embolism without acute cor pulmonale-I26.99  Indication:    Pulmonary Embolism  CPT Codes:     34710 Peripheral venous duplex scan for DVT complete       **CRITICAL RESULT**  Critical Result: Occlusive thrombus noted in bilateral legs as noted below.  Notification called to Agus Garner MD and Caitlin Ibarra MD on 11/4/2024 at 12:41:26 PM by Abilio Ramirez RVT.     CONCLUSIONS:  Right Lower Venous: There is acute occlusive deep vein thrombosis  visualized in the proximal femoral, mid femoral, distal femoral and popliteal veins. Cannot rule out thrombus in non-visualized posterior tibial and Peroneal veins due to swelling. The remainder of the right leg is negative for deep vein thrombosis.  Left Lower Venous: There is acute occlusive deep vein thrombosis visualized in the common femoral, profunda, proximal femoral, mid femoral, distal femoral and popliteal veins. Cannot rule out thrombus in non-visualized posterior tibial and peroneal veins due to swelling. The remainder of the left leg is negative for deep vein thrombosis.     Imaging & Doppler Findings:     Right                 Compressible    Thrombus            Flow  Distal External Iliac                   None       Spontaneous/Phasic  CFV                       Yes           None       Spontaneous/Phasic  PFV                       Yes           None  FV Proximal                No      Acute occlusive        None  FV Mid                     No      Acute occlusive        None  FV Distal                  No      Acute occlusive        None  Popliteal                  No      Acute occlusive        None       Left                  Compress    Thrombus        Flow  Distal External Iliac               None       Continuous  CFV                      No    Acute occlusive    None  PFV                      No    Acute occlusive    None  FV Proximal              No    Acute occlusive    None  FV Mid                   No    Acute occlusive    None  FV Distal                No    Acute occlusive    None  Popliteal                No    Acute occlusive    None       [id number] Shonda Ernandez MD  Electronically signed by [id number] Shonda Ernandez MD on 11/4/2024 at 1:46:28 PM       ** Final **      Physical Exam  Constitutional:       Appearance: Normal appearance.   HENT:      Head: Normocephalic.      Mouth/Throat:      Mouth: Mucous membranes are moist.   Eyes:      Pupils: Pupils are equal, round,  and reactive to light.   Cardiovascular:      Rate and Rhythm: Normal rate.   Pulmonary:      Effort: Pulmonary effort is normal.      Breath sounds: Normal breath sounds.   Abdominal:      General: Abdomen is flat.      Palpations: Abdomen is soft.   Musculoskeletal:         General: No swelling.      Cervical back: Normal range of motion.   Neurological:      Mental Status: He is alert and oriented to person, place, and time.      Comments: Paraplegia   Psychiatric:         Mood and Affect: Mood normal.         Relevant Results             Results for orders placed or performed during the hospital encounter of 10/31/24 (from the past 24 hours)   POCT GLUCOSE   Result Value Ref Range    POCT Glucose 135 (H) 74 - 99 mg/dL   POCT GLUCOSE   Result Value Ref Range    POCT Glucose 130 (H) 74 - 99 mg/dL   POCT GLUCOSE   Result Value Ref Range    POCT Glucose 127 (H) 74 - 99 mg/dL   Magnesium   Result Value Ref Range    Magnesium 2.53 (H) 1.60 - 2.40 mg/dL   Comprehensive Metabolic Panel   Result Value Ref Range    Glucose 107 (H) 74 - 99 mg/dL    Sodium 134 (L) 136 - 145 mmol/L    Potassium 4.9 3.5 - 5.3 mmol/L    Chloride 101 98 - 107 mmol/L    Bicarbonate 23 21 - 32 mmol/L    Anion Gap 15 10 - 20 mmol/L    Urea Nitrogen 17 6 - 23 mg/dL    Creatinine 1.19 0.50 - 1.30 mg/dL    eGFR 66 >60 mL/min/1.73m*2    Calcium 10.0 8.6 - 10.6 mg/dL    Albumin 3.9 3.4 - 5.0 g/dL    Alkaline Phosphatase 93 33 - 136 U/L    Total Protein 8.1 6.4 - 8.2 g/dL    AST 19 9 - 39 U/L    Bilirubin, Total 0.5 0.0 - 1.2 mg/dL    ALT 4 (L) 10 - 52 U/L   Phosphorus   Result Value Ref Range    Phosphorus 3.5 2.5 - 4.9 mg/dL   CBC and Auto Differential   Result Value Ref Range    WBC 8.8 4.4 - 11.3 x10*3/uL    nRBC 0.0 0.0 - 0.0 /100 WBCs    RBC 4.04 (L) 4.50 - 5.90 x10*6/uL    Hemoglobin 12.3 (L) 13.5 - 17.5 g/dL    Hematocrit 38.9 (L) 41.0 - 52.0 %    MCV 96 80 - 100 fL    MCH 30.4 26.0 - 34.0 pg    MCHC 31.6 (L) 32.0 - 36.0 g/dL    RDW 14.5 11.5 -  14.5 %    Platelets 379 150 - 450 x10*3/uL    Neutrophils % 60.0 40.0 - 80.0 %    Immature Granulocytes %, Automated 1.0 (H) 0.0 - 0.9 %    Lymphocytes % 24.7 13.0 - 44.0 %    Monocytes % 7.5 2.0 - 10.0 %    Eosinophils % 5.4 0.0 - 6.0 %    Basophils % 1.4 0.0 - 2.0 %    Neutrophils Absolute 5.26 1.20 - 7.70 x10*3/uL    Immature Granulocytes Absolute, Automated 0.09 0.00 - 0.70 x10*3/uL    Lymphocytes Absolute 2.17 1.20 - 4.80 x10*3/uL    Monocytes Absolute 0.66 0.10 - 1.00 x10*3/uL    Eosinophils Absolute 0.47 0.00 - 0.70 x10*3/uL    Basophils Absolute 0.12 (H) 0.00 - 0.10 x10*3/uL   POCT GLUCOSE   Result Value Ref Range    POCT Glucose 106 (H) 74 - 99 mg/dL       Assessment/Plan      Nino Crocker is a 69 y.o. 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 Found to have bilateral saddle PE with +Stewart's sign in course of work-up. Transferred to  CICU for further management, ultimately not deemed a thrombectomy or tPA candidate in the setting of HDS and normal O2 saturation on RA. Transferred to floor on 11/2.     Patient has been hemodynamically stable, no changes in mental status. On antibiotic for UTI, and anticoagulation due to PE and DVT. Patient with no events overnight, consider to discontinue telemetry. KORTNEY resolved we will continue follow up daily while inpatient. [facility], social work on board.      #Acute encephalopathy, suspected 2/2 UTI and polypharmacy- improving  -[facility] final[facility]2, growing only mixed urogenital haseeb. Prior urine cx from 8/2024 grew Klebsiella susceptible to levofloxacin  Plan:   -Currently on levofloxacin 750 mg daily. Plan for total 10 day course through  11/8  -Resumed home oxycodone 5mg q6h prn, plan to transition home flexeril to tizanidine at discharge  -Delirium precautions in place  -Possible discharge back to SNF in 1-2 days. Per social work note, family interested in looking for a different facility (unhappy with care received at [facility])     #Pulmonary Embolism, Intermediate to High Risk  #Right heart strain  #Extensive DVTs   -No tachycardia or hypotension noted in CICU, however these [facility] the [facility] (SBP in 90s, HR in 110s), now improved with fluids  -FRANCISCO Stage II-III  -TTE at [facility] with preserved EF, + Steawrt's sign  -b/l LE DVT US showed evidence of DVT in both LE.   Plan:   -Follow up with vascular medicine   -Continue apixaban  -Holding home anti-HTNs given likely preload dependence with known right heart strain     #KORTNEY, resolved  -Cr baseline 0.8 --> 1.5 -->1.1 -->1.04  -likely prerenal iso hypovolemia and poor PO intake vs use of nephrotoxic medications  -s/p 2L LR in ED at OSH  Plan:   -continue to monitor   -Holding home meloxicam, furoresmide, losartan, will resume as pressures tolerate     #Seizure disorder  -Continue home Keppra and lamotrigine  -No seizure-like activity noted; will continue to monitor closely, consider EEG if mental status does not continue to improve with infection and polypharmacy management     #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 bladder and bowel  -Continue home bowel regimen  -Continue home oxycodone 5 mg q6h  -Home flexeril changed to tizanidine as per above     #T2DM  -A1c 6.3% 08/2024  -Holding home metformin  -Continue with SSI, qACHS POCT glucose checks, hypoglycemia protocol     #HTN  -Holding losartan, amlodipine, lasix iso soft pressures/right heart strain  -Will re-initiate as needed/tolerated     #Hx NSTEMI  #Hx CVA  -Continue home aspirin, atorvastatin, fenofibrate     #Gout  -Holding home allopurinol iso of KORTNEY     #Hx of MDD  -resume home Effexor      F: prn  E:  prn  N: regular   GI ppx: PPI  DVT ppx: pLov     Bowel regimen: senna   Lines, Drains, Airways: pIV x2, Martinez catheter      Full Code   NOK/HPOA: wife [phone]    Case seen and discussed with attending physician, Dr. Anaya.     Poncho Monterroso MD  PGY1 Internal Medicine

## 2024-11-05 NOTE — CONSULTS
Wound Care Consult     Visit Date: 11/5/2024      Patient Name: Nino Crocker         MRN: [id number]           YOB: 1955     Reason for Consult: Multiple skin wounds        Wound History: Nino Crocker is a 69 y.o. male on day 5 of admission presenting with Acute saddle pulmonary embolism, unspecified whether acute cor pulmonale present (Multi).      Pertinent Labs:   Albumin   Date Value Ref Range Status   11/05/2024 3.9 3.4 - 5.0 g/dL Final       Wound Assessment:  Wound 11/01/24 Pressure Injury Buttock (Active)   Wound Image   11/05/24 1234   Site Assessment Red;Purple;Black 11/05/24 1234   Maria R-Wound Assessment Hypopigmented 11/05/24 1234   Non-staged Wound Description Full thickness 11/05/24 1234   Pressure Injury Stage 3 11/05/24 1234   Shape Irreg 11/05/24 1234   Wound Length (cm) 5 cm 11/05/24 1234   Wound Width (cm) 7.5 cm 11/05/24 1234   Wound Surface Area (cm^2) 37.5 cm^2 11/05/24 1234   Wound Depth (cm) 0.2 cm 11/05/24 1234   Wound Volume (cm^3) 7.5 cm^3 11/05/24 1234   Wound Healing % -150 11/05/24 1234   State of Healing Early/partial granulation 11/01/24 0400   Margins Well-defined edges 11/05/24 1234   Drainage Description Serosanguineous 11/05/24 1234   Drainage Amount Small 11/05/24 1234   Dressing Semi-permeable membrane 11/05/24 1234   Dressing Changed New 11/05/24 1234   Dressing Status Clean;Dry 11/05/24 1234       Wound 11/01/24 Other (comment) Abdomen Proximal;Right;Upper;Anterior (Active)   Wound Image   11/01/24 2013   Site Assessment Red 11/05/24 1234   Shape linear under the fold 11/05/24 1234   Wound Length (cm) 0.5 cm 11/05/24 1234   Wound Width (cm) 6 cm 11/05/24 1234   Wound Surface Area (cm^2) 3 cm^2 11/05/24 1234   Wound Depth (cm) 0 cm 11/05/24 1234   Wound Volume (cm^3) 0 cm^3 11/05/24 1234   Margins Attached edges 11/05/24 1234   Treatments Other (Comment) 11/05/24 1234   Drainage Amount Small 11/05/24 1234   Dressing Moisture barrier 11/02/24 2012   Dressing  Changed Reinforced 11/02/24 0900   Dressing Status Clean;Dry 11/03/24 2200       Wound 11/01/24 Ankle Left;Anterior (Active)   Wound Image   11/05/24 1221   Shape Irregular  11/05/24 1221   Wound Length (cm) 2 cm 11/05/24 1221   Wound Width (cm) 3 cm 11/05/24 1221   Wound Surface Area (cm^2) 6 cm^2 11/05/24 1221   Wound Depth (cm) 0.2 cm 11/05/24 1221   Wound Volume (cm^3) 1.2 cm^3 11/05/24 1221   State of Healing Non-healing 11/05/24 1221   Margins Poorly defined 11/05/24 1221   Drainage Description Serosanguineous 11/05/24 1221   Drainage Amount Small 11/05/24 1221   Dressing Hydrophilic;Semi-permeable membrane 11/05/24 1221   Dressing Changed New 11/05/24 1221   Dressing Status Clean;Dry 11/05/24 1221       Wound 11/05/24 Knee Left;Anterior (Active)   Wound Image   11/05/24 1228   Drainage Description None 11/05/24 1228   Drainage Amount None 11/05/24 1228   Dressing Open to air 11/05/24 1228       Wound 11/05/24 Dorsal foot;Left (Active)   Wound Image   11/05/24 1229   Shape small scabs 11/05/24 1229   Drainage Description None 11/05/24 1229   Drainage Amount None 11/05/24 1229   Dressing Open to air 11/05/24 1229       Wound 11/05/24 Ankle Right (Active)   Wound Image   11/05/24 1247   Shape Oval 11/05/24 1247   Wound Length (cm) 0.5 cm 11/05/24 1247   Wound Width (cm) 0.3 cm 11/05/24 1247   Wound Surface Area (cm^2) 0.15 cm^2 11/05/24 1247   Wound Depth (cm) 0.2 cm 11/05/24 1247   Wound Volume (cm^3) 0.03 cm^3 11/05/24 1247   State of Healing Non-healing 11/05/24 1247   Margins Well-defined edges 11/05/24 1247   Drainage Description Serous 11/05/24 1247   Drainage Amount Small 11/05/24 1247   Dressing Semi-permeable membrane 11/05/24 1247   Dressing Changed New 11/05/24 1247   Dressing Status Clean;Dry 11/05/24 1247       Wound Team Summary Assessment: Patient in bed with wife at bedside. He has an atypical wound to his left dorsal foot. Appears to be pressure, however, not in a typical pressure point. Maybe  from him crossing his legs? Or device related? Unsure of etiology. He has multiple scabs to his left foot/toes and left knee. Wife states these happened at skilled facility. All are dry and intact. Left them all AMISHA. He has a new wound to his right lateral ankle. Moist,pink wound bed with intact periwound skin. Buttocks with red, moist wound bed.    Recommendations:  - Left dorsal foot, buttocks and left lateral ankle, clean with Vashe, apply triad and cover with Mepilex. Change daily.  - While in bed patient should only be on one EHOB air mattress overlay (# [id number]), a fitted sheet, and one EHOB repositioning sheet ( Taps # 356592) with appropriate white chux. Please do not use brief while patient is resting in bed. TURN PT Q2 HOURS as far onto his side as tolerated. Place one wedge high from shoulders to waist, leave a gap for sacral/coccyx, and place second wedge below level of the wound. Apply EHOB Kota carmine boots ( #[id number])  to GOLDY heels.     Wound Team Plan: Wound team will not continue to follow. Please re consult for worsening of wounds.     SUZANNE GERHARDT, RN, BSN, CWOCN  11/5/2024  3:20 PM

## 2024-11-05 NOTE — PROGRESS NOTES
Physical Therapy                 Therapy Communication Note    Patient Name: Nino Crocker  MRN: [id number]  Department: [place] [address]  Room: [facility]5013-A  Today's Date: 11/5/2024     Discipline: Physical Therapy    Missed Visit Reason: Missed Visit Reason: Patient refused (Pt reporting he is too tired to work with PT and needs to rest. Will re-attempt as able.)    Missed Time: Attempt

## 2024-11-05 NOTE — PROGRESS NOTES
Pharmacy Medication History Review    Nino Crocker is a 69 y.o. male admitted for Acute saddle pulmonary embolism, unspecified whether acute cor pulmonale present (Multi). Pharmacy reviewed the patient's [id number] medications and allergies for accuracy.    The list below reflectives the updated PTA list. Please review each medication in order reconciliation for additional clarification and justification.  Medications Prior to Admission   Medication Sig Dispense Refill Last Dose/Taking    allopurinol (Zyloprim) 300 mg tablet Take 1 tablet (300 mg) by mouth once daily.   Past Week    amLODIPine (Norvasc) 10 mg tablet Take 1 tablet (10 mg) by mouth once daily.   Past Week    aspirin 81 mg EC tablet Take 1 tablet (81 mg) by mouth once daily.   Past Week    atorvastatin (Lipitor) 40 mg tablet Take 1 tablet (40 mg) by mouth once daily.   Past Week    bisacodyl (Dulcolax) 5 mg EC tablet Take 2 tablets (10 mg) by mouth once daily at bedtime. Given on TTS at facility   Past Week    cyanocobalamin (Vitamin B-12) 1,000 mcg tablet Take 1 tablet (1,000 mcg) by mouth once daily.   Past Week    cyclobenzaprine (Flexeril) 10 mg tablet Take 1 tablet (10 mg) by mouth 3 times a day.   Past Week    docusate sodium (Colace) 100 mg capsule Take 1 capsule (100 mg) by mouth every 24 (twenty four) hours if needed for constipation.   Past Week    fenofibrate (Tricor) 48 mg tablet Take 1 tablet (48 mg) by mouth once daily.   Past Week    furosemide (Lasix) 20 mg tablet Take 1 tablet (20 mg) by mouth once daily.   Past Week    gabapentin (Neurontin) 600 mg tablet Take 1 tablet (600 mg) by mouth 3 times a day.   Past Week    lamoTRIgine (LaMICtal) 25 mg tablet Take 10 tablets (250 mg) by mouth 2 times a day.   Past Week    levETIRAcetam (Keppra) 1,000 mg tablet Take 1,250 mg by mouth 2 times a day.   Past Week    losartan (Cozaar) 50 mg tablet Take 1 tablet (50 mg) by mouth once daily.   Past Week    meloxicam (Mobic) 15 mg tablet  Take 1 tablet (15 mg) by mouth once daily.   Past Week    metFORMIN (Glucophage) 500 mg tablet Take 1.5 tablets (750 mg) by mouth 2 times daily (morning and late afternoon).   Past Week    methocarbamol (Robaxin) 500 mg tablet Take 2 tablets (1,000 mg) by mouth 2 times a day.   Past Week    oxyCODONE (Oxy-IR) 5 mg immediate release capsule Take 1 capsule (5 mg) by mouth every 6 hours if needed for severe pain (7 - 10).   Past Week    pantoprazole (ProtoNix) 40 mg EC tablet Take 1 tablet (40 mg) by mouth once daily in the morning. Take before meals. Do not crush, chew, or split.   Past Week    sennosides (Senokot) 8.6 mg tablet Take 1 tablet (8.6 mg) by mouth once daily.   Past Week    venlafaxine XR (Effexor-XR) 150 mg 24 hr capsule Take 1 capsule (150 mg) by mouth once daily. Do not crush or chew.   Past Week    cholecalciferol (Vitamin D-3) 25 MCG (1000 UT) capsule Take 1 capsule (25 mcg) by mouth once daily.       diclofenac sodium (Voltaren) 1 % gel Apply 4.5 inches (4 g) topically every 6 hours if needed (pain). Apply to anterior thorax, left shoulder, left hand, and right hand       lactobacillus acidophilus & bulgar (Lactinex) 1 million cell chewable tablet Chew 1 tablet once daily.       lidocaine 4 % patch Place 3 patches on the skin once daily. Apply 1 patch on each of the following areas: left chest, right chest, and right lumbar area daily.   Remove & discard patch within 12 hours or as directed by MD.           The list below reflectives the updated allergy list. Please review each documented allergy for additional clarification and justification.  Allergies  Reviewed by Agueda Farley MD on 10/31/2024   Not on File         Below are additional concerns with the patient's PTA list.  - medication list updated per facility list  - will clarify allergies and preferred pharmacy with patient    Damaris Graf, PharmD

## 2024-11-05 NOTE — RESEARCH NOTES
Artificial Intelligence Monitoring in Nursing (AIMS Nursing) Study    Principle Investigator - Dr. Huber Hess  Research Coordinator - Meenakshi Mendiola     Patient Name - Nino Crocker  Date - 11/5/2024 11:10 AM  Location - [address] was approached by Meenakshi Mendiola to talk about participating in the AIMS Nursing Study. The patient was not able to be approached, a research coordinator will come back at a later time. Study protocol was followed and patient was given study contact information.     Meenakshi Mendiola

## 2024-11-06 LAB
ALBUMIN SERPL BCP-MCNC: 3.5 G/DL (ref 3.4–5)
ALP SERPL-CCNC: 83 U/L (ref 33–136)
ALT SERPL W P-5'-P-CCNC: 6 U/L (ref 10–52)
ANION GAP SERPL CALC-SCNC: 12 MMOL/L (ref 10–20)
AST SERPL W P-5'-P-CCNC: 19 U/L (ref 9–39)
BASOPHILS # BLD AUTO: 0.1 X10*3/UL (ref 0–0.1)
BASOPHILS NFR BLD AUTO: 1 %
BILIRUB SERPL-MCNC: 0.4 MG/DL (ref 0–1.2)
BUN SERPL-MCNC: 22 MG/DL (ref 6–23)
CALCIUM SERPL-MCNC: 9.6 MG/DL (ref 8.6–10.6)
CHLORIDE SERPL-SCNC: 100 MMOL/L (ref 98–107)
CO2 SERPL-SCNC: 27 MMOL/L (ref 21–32)
CREAT SERPL-MCNC: 1.17 MG/DL (ref 0.5–1.3)
EGFRCR SERPLBLD CKD-EPI 2021: 67 ML/MIN/1.73M*2
EOSINOPHIL # BLD AUTO: 0.67 X10*3/UL (ref 0–0.7)
EOSINOPHIL NFR BLD AUTO: 6.4 %
ERYTHROCYTE [DISTWIDTH] IN BLOOD BY AUTOMATED COUNT: 14.6 % (ref 11.5–14.5)
GLUCOSE BLD MANUAL STRIP-MCNC: 124 MG/DL (ref 74–99)
GLUCOSE BLD MANUAL STRIP-MCNC: 132 MG/DL (ref 74–99)
GLUCOSE BLD MANUAL STRIP-MCNC: 96 MG/DL (ref 74–99)
GLUCOSE SERPL-MCNC: 126 MG/DL (ref 74–99)
HCT VFR BLD AUTO: 36.9 % (ref 41–52)
HGB BLD-MCNC: 11.4 G/DL (ref 13.5–17.5)
IMM GRANULOCYTES # BLD AUTO: 0.1 X10*3/UL (ref 0–0.7)
IMM GRANULOCYTES NFR BLD AUTO: 1 % (ref 0–0.9)
LYMPHOCYTES # BLD AUTO: 1.97 X10*3/UL (ref 1.2–4.8)
LYMPHOCYTES NFR BLD AUTO: 18.9 %
MAGNESIUM SERPL-MCNC: 2.28 MG/DL (ref 1.6–2.4)
MCH RBC QN AUTO: 29.3 PG (ref 26–34)
MCHC RBC AUTO-ENTMCNC: 30.9 G/DL (ref 32–36)
MCV RBC AUTO: 95 FL (ref 80–100)
MONOCYTES # BLD AUTO: 1.21 X10*3/UL (ref 0.1–1)
MONOCYTES NFR BLD AUTO: 11.6 %
NEUTROPHILS # BLD AUTO: 6.39 X10*3/UL (ref 1.2–7.7)
NEUTROPHILS NFR BLD AUTO: 61.1 %
NRBC BLD-RTO: 0 /100 WBCS (ref 0–0)
PHOSPHATE SERPL-MCNC: 3.2 MG/DL (ref 2.5–4.9)
PLATELET # BLD AUTO: 392 X10*3/UL (ref 150–450)
POTASSIUM SERPL-SCNC: 4.5 MMOL/L (ref 3.5–5.3)
PROT SERPL-MCNC: 7.1 G/DL (ref 6.4–8.2)
RBC # BLD AUTO: 3.89 X10*6/UL (ref 4.5–5.9)
SODIUM SERPL-SCNC: 134 MMOL/L (ref 136–145)
WBC # BLD AUTO: 10.4 X10*3/UL (ref 4.4–11.3)

## 2024-11-06 PROCEDURE — 2500000001 HC RX 250 WO HCPCS SELF ADMINISTERED DRUGS (ALT 637 FOR MEDICARE OP)

## 2024-11-06 PROCEDURE — 83735 ASSAY OF MAGNESIUM: CPT

## 2024-11-06 PROCEDURE — 1100000001 HC PRIVATE ROOM DAILY

## 2024-11-06 PROCEDURE — 85025 COMPLETE CBC W/AUTO DIFF WBC: CPT

## 2024-11-06 PROCEDURE — 84100 ASSAY OF PHOSPHORUS: CPT

## 2024-11-06 PROCEDURE — 2500000005 HC RX 250 GENERAL PHARMACY W/O HCPCS

## 2024-11-06 PROCEDURE — 80053 COMPREHEN METABOLIC PANEL: CPT

## 2024-11-06 PROCEDURE — 36415 COLL VENOUS BLD VENIPUNCTURE: CPT

## 2024-11-06 PROCEDURE — 82947 ASSAY GLUCOSE BLOOD QUANT: CPT

## 2024-11-06 PROCEDURE — 99232 SBSQ HOSP IP/OBS MODERATE 35: CPT | Performed by: INTERNAL MEDICINE

## 2024-11-06 RX ADMIN — STANDARDIZED SENNA CONCENTRATE 17.2 MG: 8.6 TABLET ORAL at 10:44

## 2024-11-06 RX ADMIN — OXYCODONE HYDROCHLORIDE 5 MG: 5 TABLET ORAL at 17:45

## 2024-11-06 RX ADMIN — GABAPENTIN 400 MG: 300 CAPSULE ORAL at 17:46

## 2024-11-06 RX ADMIN — APIXABAN 10 MG: 5 TABLET, FILM COATED ORAL at 10:43

## 2024-11-06 RX ADMIN — OXYCODONE HYDROCHLORIDE 5 MG: 5 TABLET ORAL at 02:09

## 2024-11-06 RX ADMIN — ALLOPURINOL 300 MG: 300 TABLET ORAL at 10:44

## 2024-11-06 RX ADMIN — STANDARDIZED SENNA CONCENTRATE 17.2 MG: 8.6 TABLET ORAL at 20:53

## 2024-11-06 RX ADMIN — LEVETIRACETAM 1250 MG: 500 TABLET, FILM COATED ORAL at 20:53

## 2024-11-06 RX ADMIN — OXYCODONE HYDROCHLORIDE 5 MG: 5 TABLET ORAL at 11:05

## 2024-11-06 RX ADMIN — LEVETIRACETAM 1250 MG: 500 TABLET, FILM COATED ORAL at 10:44

## 2024-11-06 RX ADMIN — APIXABAN 10 MG: 5 TABLET, FILM COATED ORAL at 20:53

## 2024-11-06 RX ADMIN — GABAPENTIN 400 MG: 300 CAPSULE ORAL at 20:53

## 2024-11-06 RX ADMIN — Medication 1000 UNITS: at 10:43

## 2024-11-06 RX ADMIN — LIDOCAINE 3 PATCH: 4 PATCH TOPICAL at 10:40

## 2024-11-06 RX ADMIN — CYANOCOBALAMIN TAB 1000 MCG 1000 MCG: 1000 TAB at 10:42

## 2024-11-06 RX ADMIN — VENLAFAXINE HYDROCHLORIDE 150 MG: 150 CAPSULE, EXTENDED RELEASE ORAL at 10:42

## 2024-11-06 RX ADMIN — FENOFIBRATE 54 MG: 54 TABLET, FILM COATED ORAL at 17:46

## 2024-11-06 RX ADMIN — LAMOTRIGINE 250 MG: 100 TABLET ORAL at 10:42

## 2024-11-06 RX ADMIN — ATORVASTATIN CALCIUM 40 MG: 40 TABLET, FILM COATED ORAL at 10:43

## 2024-11-06 RX ADMIN — GABAPENTIN 400 MG: 300 CAPSULE ORAL at 10:44

## 2024-11-06 RX ADMIN — ASPIRIN 81 MG: 81 TABLET, COATED ORAL at 10:43

## 2024-11-06 RX ADMIN — PANTOPRAZOLE SODIUM 40 MG: 40 TABLET, DELAYED RELEASE ORAL at 10:43

## 2024-11-06 RX ADMIN — LEVOFLOXACIN 750 MG: 750 TABLET, FILM COATED ORAL at 11:29

## 2024-11-06 ASSESSMENT — PAIN SCALES - GENERAL
PAINLEVEL_OUTOF10: 7
PAINLEVEL_OUTOF10: 7
PAINLEVEL_OUTOF10: 8
PAINLEVEL_OUTOF10: 6
PAINLEVEL_OUTOF10: 0 - NO PAIN

## 2024-11-06 ASSESSMENT — COGNITIVE AND FUNCTIONAL STATUS - GENERAL
TURNING FROM BACK TO SIDE WHILE IN FLAT BAD: TOTAL
MOVING TO AND FROM BED TO CHAIR: TOTAL
MOBILITY SCORE: 8
CLIMB 3 TO 5 STEPS WITH RAILING: TOTAL
MOVING FROM LYING ON BACK TO SITTING ON SIDE OF FLAT BED WITH BEDRAILS: A LITTLE
WALKING IN HOSPITAL ROOM: TOTAL
STANDING UP FROM CHAIR USING ARMS: TOTAL

## 2024-11-06 ASSESSMENT — PAIN - FUNCTIONAL ASSESSMENT
PAIN_FUNCTIONAL_ASSESSMENT: 0-10

## 2024-11-06 ASSESSMENT — PAIN DESCRIPTION - LOCATION
LOCATION: SHOULDER
LOCATION: SHOULDER

## 2024-11-06 ASSESSMENT — PAIN DESCRIPTION - ORIENTATION: ORIENTATION: LEFT

## 2024-11-06 ASSESSMENT — PAIN SCALES - PAIN ASSESSMENT IN ADVANCED DEMENTIA (PAINAD): BREATHING: NORMAL

## 2024-11-06 NOTE — CARE PLAN
Problem: Skin  Goal: Prevent/manage excess moisture  11/6/2024 1617 by Henry Woods RN  Outcome: Progressing  11/6/2024 1616 by Henry Woods RN  Outcome: Progressing     Problem: Skin  Goal: Prevent/minimize sheer/friction injuries  11/6/2024 1617 by Henry Woods RN  Outcome: Progressing  Flowsheets (Taken 11/6/2024 1617)  Prevent/minimize sheer/friction injuries:   Turn/reposition every 2 hours/use positioning/transfer devices   Use pull sheet  11/6/2024 1616 by Henry Woods RN  Outcome: Progressing     Problem: Fall/Injury  Goal: Not fall by end of shift  11/6/2024 1617 by Henry Woods RN  Outcome: Progressing  11/6/2024 1616 by Henry Woods RN  Outcome: Progressing     Problem: Fall/Injury  Goal: Not fall by end of shift  11/6/2024 1617 by Henry Woods RN  Outcome: Progressing  11/6/2024 1616 by Henry Woods RN  Outcome: Progressing     Problem: Fall/Injury  Goal: Be free from injury by end of the shift  11/6/2024 1617 by Henry Woods RN  Outcome: Progressing  11/6/2024 1616 by Henry Woods RN  Outcome: Progressing   The patient's goals for the shift include      The clinical goals for the shift include Patient will remain free from falls and injury    Over the shift, the patient did remain free from injuries and falls during this work shift.

## 2024-11-06 NOTE — PROGRESS NOTES
Transitional Care Coordination Progress Note:  Patient discussed during interdisciplinary rounds.  Team members present: MD, MIA  Plan per Medical/Surgical team: Per medical team pt is medically ready (as of 11/5) pending [facility].  [facility]  Status: Inpatient  [facility].  Potential Barriers: none  ADOD: 11/6  SW is following for [facility] placement. Care coordinator will continue to follow for discharge planning needs.     Adeel Washington RN  Transitional Care Coordinator (TCC)  [phone] or [phone]

## 2024-11-06 NOTE — PROGRESS NOTES
Christina Crocker is a 69 y.o. male on hospital day 6 who is doing well this morning when I saw him.  Is without complaints    Objective     Exam     Vitals:    11/05/24 2351 11/06/24 0434 11/06/24 0832 11/06/24 1220   BP: 108/65 98/63 105/81 133/61   Pulse: 95 105 98 91   Resp: 18 18 18    Temp: 36.2 °C (97.2 °F) 36.5 °C (97.7 °F) 36.4 °C (97.5 °F) 36.2 °C (97.2 °F)   TempSrc:       SpO2: 96% 95% 94% 94%   Weight:       Height:          Intake/Output last 3 shifts:  I/O last 3 completed shifts:  In: - (0 mL/kg)   Out: 800 (6.7 mL/kg) [Urine:800 (0.2 mL/kg/hr)]  Weight: 119 kg     Physical Exam  Vitals reviewed.   Constitutional:       General: He is not in acute distress.     Appearance: He is obese. He is not ill-appearing, toxic-appearing or diaphoretic.   HENT:      Head: Normocephalic and atraumatic.      Mouth/Throat:      Mouth: Mucous membranes are moist.   Cardiovascular:      Rate and Rhythm: Normal rate and regular rhythm.      Pulses: Normal pulses.      Heart sounds: No murmur heard.     No friction rub. No gallop.   Pulmonary:      Effort: Pulmonary effort is normal.      Breath sounds: Normal breath sounds. No wheezing, rhonchi or rales.      Comments: Anteriorly  Abdominal:      General: Bowel sounds are normal. There is no distension.      Palpations: Abdomen is soft.      Tenderness: There is no abdominal tenderness. There is no guarding or rebound.   Musculoskeletal:      Comments: 1+ bilateral lower extremity edema   Skin:     General: Skin is warm and dry.   Neurological:      Mental Status: He is alert and oriented to person, place, and time.      Comments: Paraplegic   Psychiatric:         Mood and Affect: Mood normal.         Behavior: Behavior normal.      Comments: Was very pleasant after I woke him up.            Medications   allopurinol, 300 mg, oral, Daily  apixaban, 10 mg, oral, BID   Followed by  [START ON 11/10/2024] apixaban, 5 mg, oral, BID  aspirin, 81 mg, oral,  "Daily  atorvastatin, 40 mg, oral, Daily  bisacodyl, 10 mg, oral, Every Tues/Thur  cholecalciferol, 1,000 Units, oral, Daily  cyanocobalamin, 1,000 mcg, oral, Daily  fenofibrate, 54 mg, oral, Daily  gabapentin, 400 mg, oral, TID  insulin lispro, 0-5 Units, subcutaneous, TID  lamoTRIgine, 250 mg, oral, BID  levETIRAcetam, 1,250 mg, oral, BID  levoFLOXacin, 750 mg, oral, q24h  lidocaine, 3 patch, transdermal, Daily  pantoprazole, 40 mg, oral, Daily before breakfast  sennosides, 2 tablet, oral, BID  venlafaxine XR, 150 mg, oral, Daily with breakfast       PRN medications: acetaminophen **OR** acetaminophen, dextrose, dextrose, glucagon, glucagon, oxyCODONE, tiZANidine       Labs     All new labs reviewed:  some of the basic labs as follows -     Results from last 7 days   Lab Units 11/06/24 0822 11/05/24 0812 11/04/24  0709   WBC AUTO x10*3/uL 10.4 8.8 8.9   HEMOGLOBIN g/dL 11.4* 12.3* 10.6*   HEMATOCRIT % 36.9* 38.9* 33.5*   PLATELETS AUTO x10*3/uL 392 379 370   NEUTROS PCT AUTO % 61.1 60.0 58.8   LYMPHS PCT AUTO % 18.9 24.7 24.1   MONOS PCT AUTO % 11.6 7.5 9.4   EOS PCT AUTO % 6.4 5.4 6.1          Results from last 72 hours   Lab Units 11/06/24  0822 11/05/24 0812 11/04/24  0709   SODIUM mmol/L 134* 134* 136   POTASSIUM mmol/L 4.5 4.9 4.1   CHLORIDE mmol/L 100 101 103   CO2 mmol/L 27 23 23   BUN mg/dL 22 17 13   CREATININE mg/dL 1.17 1.19 1.04     Results from last 72 hours   Lab Units 11/06/24  0822 11/05/24  0812 11/04/24  0709   ALK PHOS U/L 83 93 84   AST U/L 19 19 12   ALT U/L 6* 4* 5*   BILIRUBIN TOTAL mg/dL 0.4 0.5 0.3   ALBUMIN g/dL 3.5 3.9 3.3*   PROTEIN TOTAL g/dL 7.1 8.1 6.7     Results from last 72 hours   Lab Units 11/06/24  0822 11/05/24  0812 11/04/24  0709   GLUCOSE mg/dL 126* 107* 101*         No results found for: \"TR1\"  No results found for: \"URINECULTURE\", \"BLOODCULT\"         Imaging   Vascular US Lower Extremity Venous Duplex [address], " [place] [address]    Tel [phone] and Fax [phone]       Vascular Lab Report  [facility] US LOWER EXTREMITY VENOUS DUPLEX BILATERAL       Patient Name:      JEANNA OTTO      Reading Physician:  [id number] Shonda Ernandez MD  Study Date:        11/4/2024            Ordering Physician: [id number] TARA DAVIS  MRN/PID:           [id number]             Technologist:       Abilio Ramirez RVT  Accession#:        [id number]         Technologist 2:  Date of Birth/Age: 1955 / 69 years Encounter#:         [id number]  Gender:            M  Admission Status:  Inpatient            Location Performed: [facility]       Diagnosis/ICD: Other pulmonary embolism without acute cor pulmonale-I26.99  Indication:    Pulmonary Embolism  CPT Codes:     92943 Peripheral venous duplex scan for DVT complete       **CRITICAL RESULT**  Critical Result: Occlusive thrombus noted in bilateral legs as noted below.  Notification called to Agus Garner MD and Caitlin Ibarra MD on 11/4/2024 at 12:41:26 PM by Abilio Ramirez RVT.     CONCLUSIONS:  Right Lower Venous: There is acute occlusive deep vein thrombosis visualized in the proximal femoral, mid femoral, distal femoral and popliteal veins. Cannot rule out thrombus in non-visualized posterior tibial and Peroneal veins due to swelling. The remainder of the right leg is negative for deep vein thrombosis.  Left Lower Venous: There is acute occlusive deep vein thrombosis visualized in the common femoral, profunda, proximal femoral, mid femoral, distal femoral and popliteal veins. Cannot rule out thrombus in non-visualized posterior tibial and peroneal veins due to swelling. The remainder of the left leg is negative for deep vein thrombosis.     Imaging & Doppler Findings:     Right                 Compressible    Thrombus            Flow  Distal External Iliac                   None        Spontaneous/Phasic  CFV                       Yes           None       Spontaneous/Phasic  PFV                       Yes           None  FV Proximal                No      Acute occlusive        None  FV Mid                     No      Acute occlusive        None  FV Distal                  No      Acute occlusive        None  Popliteal                  No      Acute occlusive        None       Left                  Compress    Thrombus        Flow  Distal External Iliac               None       Continuous  CFV                      No    Acute occlusive    None  PFV                      No    Acute occlusive    None  FV Proximal              No    Acute occlusive    None  FV Mid                   No    Acute occlusive    None  FV Distal                No    Acute occlusive    None  Popliteal                No    Acute occlusive    None       [id number] Shonda Ernandez MD  Electronically signed by [id number] Shonda Ernandez MD on 11/4/2024 at 1:46:28 PM       ** Final **     No results found for this or any previous visit from the past 1095 days.     Encounter Date: 10/31/24   Electrocardiogram, 12-lead PRN ACS symptoms   Result Value    Ventricular Rate 98    Atrial Rate 98    NH Interval 174    QRS Duration 112    QT Interval 368    QTC Calculation(Bazett) 469    P Axis 36    R Axis 59    T Axis 89    QRS Count 16    Q Onset 223    P Onset 136    P Offset 197    T Offset 407    QTC Fredericia 433    Narrative    Normal sinus rhythm  Incomplete right bundle branch block  Cannot rule out Anteroseptal infarct , age undetermined  Abnormal ECG  No previous ECGs available          Assessment and Plan     Extensive DVTs with PE: Echo showed right heart strain.  Patient at risk given his immobility.  He noted his left leg initially started with the swelling a few weeks back.  It was not until later that the right leg became swollen.  Of note patient did have surgery however this was approximately 4 months ago.  Of  note hemoglobin jumped today to 12.3 from 10.6.  Would not be surprised we see this come back down tomorrow which would not necessarily indicate any blood loss  -Continue anticoagulation/DOAC.    -Follow-up vascular medicine    UTI: Urine culture mixed growth  -Complete planned levofloxacin based on previous urine culture results    Psychiatry:  -Continue home Effexor    Acute renal failure: Resolved.  Creatinine remains roughly stable at approximately 1-1.2  -NTD    Cardiovascular: Right heart strain on echo from PE.  Blood pressure has been low to marginal with most recent recording at 97/62  -Holding vasoactive meds (losartan, amlodipine, Lasix)  -Continue aspirin, statin, fenofibrate    CNS: Seizure disorder and paraplegia  -Continue home Keppra and lamotrigine  -Pain control with bowel care    Diabetes mellitus:  -Continue sliding scale insulin  -May resume metformin on discharge    Gout:  -Continue allopurinol    GI:  -Continue PPI  -Continue bowel care    DVT prophylaxis covered by DOAC    Physical therapy/Occupational Therapy     Patient and wife requesting transfer back to [facility].  [facility] need for new [facility] as they were not pleased with previous rehab because they did not engage patient enough.  Process is underway and awaiting word from [facility] for acceptance versus options for dispo for rehab    Kraig Anaya MD     Of note the above was done with Dragon dictation system.  Note was proofread to minimize errors.

## 2024-11-06 NOTE — RESEARCH NOTES
Artificial Intelligence Monitoring in Nursing (AIMS Nursing) Study    Principle Investigator - Dr. Huber Hess  Research Coordinator - Sandi Santiago RN     Patient Name - Nino Crocker  Date - 11/6/2024 2:53 PM  Location - [address] was approached by Sandi Santiago RN to talk about participating in the AIMS Nursing Study. The patient was not able to be approached, a research coordinator will come back at a later time. Study protocol was followed and patient was given study contact information.     Sandi Santiago RN

## 2024-11-06 NOTE — PROGRESS NOTES
Nino Crocker is a 69 y.o. male on day 6 of admission presenting with Acute saddle pulmonary embolism, unspecified whether acute cor pulmonale present (Multi).  DOUG consulted on 11/4/24 to [facility].     DOUG informed that Sofy, [facility] coordinator, is following for the pt's transfer today. oSfy called DOUG and asked for updated clinicals to be faxed to her at [phone].    DOUG called Sofy ([phone]) to ask for an update regarding the transfer. Sofy said she [facility] to [facility] team at the [facility], and had to present the pt to another dept at the [facility] med.     DOUG called Sandeep, [facility] coordinator ([phone]), for an update. DOUG left a vm for Sandeep and is awaiting a response.    SW awaiting an update.    SW to follow.    Pt adod: 11/6/24   [facility]: [facility]  Barrier: [facility] delay    Diana Phillips MSW LSW  Care Transitions  2  [phone] or Epic Secure Chat

## 2024-11-07 LAB
ALBUMIN SERPL BCP-MCNC: 3.5 G/DL (ref 3.4–5)
ALP SERPL-CCNC: 80 U/L (ref 33–136)
ALT SERPL W P-5'-P-CCNC: 4 U/L (ref 10–52)
ANION GAP SERPL CALC-SCNC: 15 MMOL/L (ref 10–20)
AST SERPL W P-5'-P-CCNC: 17 U/L (ref 9–39)
BASOPHILS # BLD AUTO: 0.08 X10*3/UL (ref 0–0.1)
BASOPHILS NFR BLD AUTO: 0.9 %
BILIRUB SERPL-MCNC: 0.4 MG/DL (ref 0–1.2)
BUN SERPL-MCNC: 22 MG/DL (ref 6–23)
CALCIUM SERPL-MCNC: 9.5 MG/DL (ref 8.6–10.6)
CHLORIDE SERPL-SCNC: 100 MMOL/L (ref 98–107)
CO2 SERPL-SCNC: 22 MMOL/L (ref 21–32)
CREAT SERPL-MCNC: 1.21 MG/DL (ref 0.5–1.3)
EGFRCR SERPLBLD CKD-EPI 2021: 64 ML/MIN/1.73M*2
EOSINOPHIL # BLD AUTO: 0.58 X10*3/UL (ref 0–0.7)
EOSINOPHIL NFR BLD AUTO: 6.5 %
ERYTHROCYTE [DISTWIDTH] IN BLOOD BY AUTOMATED COUNT: 14.6 % (ref 11.5–14.5)
GLUCOSE BLD MANUAL STRIP-MCNC: 132 MG/DL (ref 74–99)
GLUCOSE BLD MANUAL STRIP-MCNC: 136 MG/DL (ref 74–99)
GLUCOSE BLD MANUAL STRIP-MCNC: 150 MG/DL (ref 74–99)
GLUCOSE BLD MANUAL STRIP-MCNC: 155 MG/DL (ref 74–99)
GLUCOSE SERPL-MCNC: 101 MG/DL (ref 74–99)
HCT VFR BLD AUTO: 37.7 % (ref 41–52)
HGB BLD-MCNC: 11.6 G/DL (ref 13.5–17.5)
IMM GRANULOCYTES # BLD AUTO: 0.1 X10*3/UL (ref 0–0.7)
IMM GRANULOCYTES NFR BLD AUTO: 1.1 % (ref 0–0.9)
LYMPHOCYTES # BLD AUTO: 1.93 X10*3/UL (ref 1.2–4.8)
LYMPHOCYTES NFR BLD AUTO: 21.5 %
MAGNESIUM SERPL-MCNC: 2.47 MG/DL (ref 1.6–2.4)
MCH RBC QN AUTO: 30.2 PG (ref 26–34)
MCHC RBC AUTO-ENTMCNC: 30.8 G/DL (ref 32–36)
MCV RBC AUTO: 98 FL (ref 80–100)
MONOCYTES # BLD AUTO: 1.03 X10*3/UL (ref 0.1–1)
MONOCYTES NFR BLD AUTO: 11.5 %
NEUTROPHILS # BLD AUTO: 5.26 X10*3/UL (ref 1.2–7.7)
NEUTROPHILS NFR BLD AUTO: 58.5 %
NRBC BLD-RTO: 0 /100 WBCS (ref 0–0)
PHOSPHATE SERPL-MCNC: 3.2 MG/DL (ref 2.5–4.9)
PLATELET # BLD AUTO: 414 X10*3/UL (ref 150–450)
POTASSIUM SERPL-SCNC: 4.4 MMOL/L (ref 3.5–5.3)
PROT SERPL-MCNC: 7 G/DL (ref 6.4–8.2)
RBC # BLD AUTO: 3.84 X10*6/UL (ref 4.5–5.9)
SARS-COV-2 RNA RESP QL NAA+PROBE: NOT DETECTED
SODIUM SERPL-SCNC: 133 MMOL/L (ref 136–145)
WBC # BLD AUTO: 9 X10*3/UL (ref 4.4–11.3)

## 2024-11-07 PROCEDURE — 2500000005 HC RX 250 GENERAL PHARMACY W/O HCPCS

## 2024-11-07 PROCEDURE — 80053 COMPREHEN METABOLIC PANEL: CPT

## 2024-11-07 PROCEDURE — 2500000001 HC RX 250 WO HCPCS SELF ADMINISTERED DRUGS (ALT 637 FOR MEDICARE OP)

## 2024-11-07 PROCEDURE — 83735 ASSAY OF MAGNESIUM: CPT

## 2024-11-07 PROCEDURE — 82947 ASSAY GLUCOSE BLOOD QUANT: CPT

## 2024-11-07 PROCEDURE — 1100000001 HC PRIVATE ROOM DAILY

## 2024-11-07 PROCEDURE — 84100 ASSAY OF PHOSPHORUS: CPT

## 2024-11-07 PROCEDURE — 36415 COLL VENOUS BLD VENIPUNCTURE: CPT

## 2024-11-07 PROCEDURE — 87635 SARS-COV-2 COVID-19 AMP PRB: CPT

## 2024-11-07 PROCEDURE — 2500000002 HC RX 250 W HCPCS SELF ADMINISTERED DRUGS (ALT 637 FOR MEDICARE OP, ALT 636 FOR OP/ED)

## 2024-11-07 PROCEDURE — 99232 SBSQ HOSP IP/OBS MODERATE 35: CPT | Performed by: INTERNAL MEDICINE

## 2024-11-07 PROCEDURE — 85025 COMPLETE CBC W/AUTO DIFF WBC: CPT

## 2024-11-07 RX ORDER — LEVOFLOXACIN 750 MG/1
750 TABLET ORAL EVERY 24 HOURS
Status: COMPLETED | OUTPATIENT
Start: 2024-11-08 | End: 2024-11-08

## 2024-11-07 RX ADMIN — OXYCODONE HYDROCHLORIDE 5 MG: 5 TABLET ORAL at 11:03

## 2024-11-07 RX ADMIN — Medication 1000 UNITS: at 10:01

## 2024-11-07 RX ADMIN — ALLOPURINOL 300 MG: 300 TABLET ORAL at 10:00

## 2024-11-07 RX ADMIN — ASPIRIN 81 MG: 81 TABLET, COATED ORAL at 10:00

## 2024-11-07 RX ADMIN — PANTOPRAZOLE SODIUM 40 MG: 40 TABLET, DELAYED RELEASE ORAL at 10:00

## 2024-11-07 RX ADMIN — LAMOTRIGINE 250 MG: 100 TABLET ORAL at 20:44

## 2024-11-07 RX ADMIN — LIDOCAINE 3 PATCH: 4 PATCH TOPICAL at 10:02

## 2024-11-07 RX ADMIN — TIZANIDINE 2 MG: 2 TABLET ORAL at 20:44

## 2024-11-07 RX ADMIN — BISACODYL 10 MG: 5 TABLET, COATED ORAL at 00:22

## 2024-11-07 RX ADMIN — GABAPENTIN 400 MG: 300 CAPSULE ORAL at 10:00

## 2024-11-07 RX ADMIN — LAMOTRIGINE 250 MG: 100 TABLET ORAL at 10:01

## 2024-11-07 RX ADMIN — LAMOTRIGINE 250 MG: 100 TABLET ORAL at 00:22

## 2024-11-07 RX ADMIN — LEVETIRACETAM 1250 MG: 500 TABLET, FILM COATED ORAL at 20:44

## 2024-11-07 RX ADMIN — OXYCODONE HYDROCHLORIDE 5 MG: 5 TABLET ORAL at 19:19

## 2024-11-07 RX ADMIN — GABAPENTIN 400 MG: 300 CAPSULE ORAL at 20:44

## 2024-11-07 RX ADMIN — STANDARDIZED SENNA CONCENTRATE 17.2 MG: 8.6 TABLET ORAL at 20:44

## 2024-11-07 RX ADMIN — LEVETIRACETAM 1250 MG: 500 TABLET, FILM COATED ORAL at 09:59

## 2024-11-07 RX ADMIN — STANDARDIZED SENNA CONCENTRATE 17.2 MG: 8.6 TABLET ORAL at 10:00

## 2024-11-07 RX ADMIN — LEVOFLOXACIN 750 MG: 750 TABLET, FILM COATED ORAL at 10:01

## 2024-11-07 RX ADMIN — VENLAFAXINE HYDROCHLORIDE 150 MG: 150 CAPSULE, EXTENDED RELEASE ORAL at 10:01

## 2024-11-07 RX ADMIN — APIXABAN 10 MG: 5 TABLET, FILM COATED ORAL at 10:00

## 2024-11-07 RX ADMIN — ATORVASTATIN CALCIUM 40 MG: 40 TABLET, FILM COATED ORAL at 10:00

## 2024-11-07 RX ADMIN — FENOFIBRATE 54 MG: 54 TABLET, FILM COATED ORAL at 10:01

## 2024-11-07 RX ADMIN — APIXABAN 10 MG: 5 TABLET, FILM COATED ORAL at 20:46

## 2024-11-07 RX ADMIN — CYANOCOBALAMIN TAB 1000 MCG 1000 MCG: 1000 TAB at 10:01

## 2024-11-07 ASSESSMENT — PAIN - FUNCTIONAL ASSESSMENT
PAIN_FUNCTIONAL_ASSESSMENT: 0-10

## 2024-11-07 ASSESSMENT — PAIN SCALES - GENERAL
PAINLEVEL_OUTOF10: 5 - MODERATE PAIN
PAINLEVEL_OUTOF10: 0 - NO PAIN
PAINLEVEL_OUTOF10: 7

## 2024-11-07 ASSESSMENT — PAIN DESCRIPTION - LOCATION: LOCATION: SHOULDER

## 2024-11-07 NOTE — PROGRESS NOTES
Nino Crocker is a 69 y.o. male on day 7 of admission presenting with Acute saddle pulmonary embolism, unspecified whether acute cor pulmonale present (Multi).      Subjective   Patient did well, no acute overnight events. Denied chest pain, SOB.        Objective     Last Recorded Vitals  /65   Pulse 102   Temp 36.4 °C (97.5 °F)   Resp 15   Wt 119 kg (262 lb 5.6 oz)   SpO2 98%   Intake/Output last 3 Shifts:    Intake/Output Summary (Last 24 hours) at 11/7/2024 1307  Last data filed at 11/7/2024 0540  Gross per 24 hour   Intake --   Output 1250 ml   Net -1250 ml       Admission Weight  Weight: 117 kg (257 lb 15 oz) (11/01/24 0000)    Daily Weight  11/05/24 : 119 kg (262 lb 5.6 oz)    Image Results  Vascular US Lower Extremity Venous Duplex Bilateral              [place] [address]    Tel [phone] and Fax [phone]       Vascular Lab Report  VASC US LOWER EXTREMITY VENOUS DUPLEX BILATERAL       Patient Name:      NINO CROCKER      Reading Physician:  [id number] Shonda Ernandez MD  Study Date:        11/4/2024            Ordering Physician: [id number] DAVIS  MRN/PID:           [id number]             Technologist:       Abilio Ramirez RVT  Accession#:        [id number]         Technologist 2:  Date of Birth/Age: 1955 / 69 years Encounter#:         [id number]  Gender:            M  Admission Status:  Inpatient            Location Performed: [facility]       Diagnosis/ICD: Other pulmonary embolism without acute cor pulmonale-I26.99  Indication:    Pulmonary Embolism  CPT Codes:     24100 Peripheral venous duplex scan for DVT complete       **CRITICAL RESULT**  Critical Result: Occlusive thrombus noted in bilateral legs as noted below.  Notification called to Agus Garner MD and Caitlin Ibarra MD on 11/4/2024 at 12:41:26 PM by Abilio Ramirez RVT.      CONCLUSIONS:  Right Lower Venous: There is acute occlusive deep vein thrombosis visualized in the proximal femoral, mid femoral, distal femoral and popliteal veins. Cannot rule out thrombus in non-visualized posterior tibial and Peroneal veins due to swelling. The remainder of the right leg is negative for deep vein thrombosis.  Left Lower Venous: There is acute occlusive deep vein thrombosis visualized in the common femoral, profunda, proximal femoral, mid femoral, distal femoral and popliteal veins. Cannot rule out thrombus in non-visualized posterior tibial and peroneal veins due to swelling. The remainder of the left leg is negative for deep vein thrombosis.     Imaging & Doppler Findings:     Right                 Compressible    Thrombus            Flow  Distal External Iliac                   None       Spontaneous/Phasic  CFV                       Yes           None       Spontaneous/Phasic  PFV                       Yes           None  FV Proximal                No      Acute occlusive        None  FV Mid                     No      Acute occlusive        None  FV Distal                  No      Acute occlusive        None  Popliteal                  No      Acute occlusive        None       Left                  Compress    Thrombus        Flow  Distal External Iliac               None       Continuous  CFV                      No    Acute occlusive    None  PFV                      No    Acute occlusive    None  FV Proximal              No    Acute occlusive    None  FV Mid                   No    Acute occlusive    None  FV Distal                No    Acute occlusive    None  Popliteal                No    Acute occlusive    None       [id number] Shonda Ernandez MD  Electronically signed by [id number] Shonda Ernandez MD on 11/4/2024 at 1:46:28 PM       ** Final **      Physical Exam  Constitutional:       Appearance: Normal appearance.   HENT:      Head: Normocephalic.      Mouth/Throat:       Mouth: Mucous membranes are moist.   Eyes:      Pupils: Pupils are equal, round, and reactive to light.   Cardiovascular:      Rate and Rhythm: Normal rate.   Pulmonary:      Effort: Pulmonary effort is normal.      Breath sounds: Normal breath sounds.   Abdominal:      General: Abdomen is flat.      Palpations: Abdomen is soft.   Musculoskeletal:         General: No swelling.      Cervical back: Normal range of motion.   Neurological:      Mental Status: He is alert and oriented to person, place, and time.      Comments: Paraplegia   Psychiatric:         Mood and Affect: Mood normal.         Relevant Results             Results for orders placed or performed during the hospital encounter of 10/31/24 (from the past 24 hours)   POCT GLUCOSE   Result Value Ref Range    POCT Glucose 96 74 - 99 mg/dL   Magnesium   Result Value Ref Range    Magnesium 2.47 (H) 1.60 - 2.40 mg/dL   Comprehensive Metabolic Panel   Result Value Ref Range    Glucose 101 (H) 74 - 99 mg/dL    Sodium 133 (L) 136 - 145 mmol/L    Potassium 4.4 3.5 - 5.3 mmol/L    Chloride 100 98 - 107 mmol/L    Bicarbonate 22 21 - 32 mmol/L    Anion Gap 15 10 - 20 mmol/L    Urea Nitrogen 22 6 - 23 mg/dL    Creatinine 1.21 0.50 - 1.30 mg/dL    eGFR 64 >60 mL/min/1.73m*2    Calcium 9.5 8.6 - 10.6 mg/dL    Albumin 3.5 3.4 - 5.0 g/dL    Alkaline Phosphatase 80 33 - 136 U/L    Total Protein 7.0 6.4 - 8.2 g/dL    AST 17 9 - 39 U/L    Bilirubin, Total 0.4 0.0 - 1.2 mg/dL    ALT 4 (L) 10 - 52 U/L   Phosphorus   Result Value Ref Range    Phosphorus 3.2 2.5 - 4.9 mg/dL   CBC and Auto Differential   Result Value Ref Range    WBC 9.0 4.4 - 11.3 x10*3/uL    nRBC 0.0 0.0 - 0.0 /100 WBCs    RBC 3.84 (L) 4.50 - 5.90 x10*6/uL    Hemoglobin 11.6 (L) 13.5 - 17.5 g/dL    Hematocrit 37.7 (L) 41.0 - 52.0 %    MCV 98 80 - 100 fL    MCH 30.2 26.0 - 34.0 pg    MCHC 30.8 (L) 32.0 - 36.0 g/dL    RDW 14.6 (H) 11.5 - 14.5 %    Platelets 414 150 - 450 x10*3/uL    Neutrophils % 58.5 40.0 - 80.0  %    Immature Granulocytes %, Automated 1.1 (H) 0.0 - 0.9 %    Lymphocytes % 21.5 13.0 - 44.0 %    Monocytes % 11.5 2.0 - 10.0 %    Eosinophils % 6.5 0.0 - 6.0 %    Basophils % 0.9 0.0 - 2.0 %    Neutrophils Absolute 5.26 1.20 - 7.70 x10*3/uL    Immature Granulocytes Absolute, Automated 0.10 0.00 - 0.70 x10*3/uL    Lymphocytes Absolute 1.93 1.20 - 4.80 x10*3/uL    Monocytes Absolute 1.03 (H) 0.10 - 1.00 x10*3/uL    Eosinophils Absolute 0.58 0.00 - 0.70 x10*3/uL    Basophils Absolute 0.08 0.00 - 0.10 x10*3/uL   POCT GLUCOSE   Result Value Ref Range    POCT Glucose 132 (H) 74 - 99 mg/dL   Sars-CoV-2 PCR   Result Value Ref Range    Coronavirus 2019, PCR Not Detected Not Detected   POCT GLUCOSE   Result Value Ref Range    POCT Glucose 155 (H) 74 - 99 mg/dL       Assessment/Plan      Nino Crocker is a 69 y.o. 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 Found to have bilateral saddle PE with +Stewart's sign in course of work-up. Transferred to  CICU for further management, ultimately not deemed a thrombectomy or tPA candidate in the setting of HDS and normal O2 saturation on RA. Transferred to floor on 11/2.     Patient has been hemodynamically stable, no changes in mental status. On antibiotic for UTI, and anticoagulation due to PE and DVT. KORTNEY resolved we will continue follow up daily while inpatient. P[facility] from [facility] to define [facility], social work on board.      #Acute encephalopathy, suspected 2/2 UTI and polypharmacy- improving  -[facility] cx from [facility] finalized 11/2, growing only mixed urogenital haseeb. Prior urine cx from 8/2024 grew Klebsiella susceptible to levofloxacin  Plan:   Currently on levofloxacin 750 mg daily. Plan for  total 10 day course through 11/8  Resumed home oxycodone 5mg q6h prn, plan to transition home flexeril to tizanidine at discharge  Delirium precautions in place  COVID test that was requested by [facility] is negative. Being discharged to [facility] tomorrow 11/8.      #Pulmonary Embolism, Intermediate to High Risk  #Right heart strain  #Extensive DVTs   -No tachycardia or hypotension noted in CICU, however these were reported at the [facility] (SBP in 90s, HR in 110s), now improved with fluids  -FRANCISCO Stage II-III  -TTE at [facility] with preserved EF, + Stewart's sign  -b/l LE DVT US showed evidence of DVT in both LE.   Plan:   Follow up with vascular medicine   Continue apixaban  Holding home anti-HTNs given likely preload dependence with known right heart strain     #KORTNEY, resolved  -Cr baseline 0.8 --> 1.5 -->1.1 -->1.04  -likely prerenal iso hypovolemia and poor PO intake vs use of nephrotoxic medications  -s/p 2L LR in ED at OSH  Plan:   Continue to monitor   Holding home meloxicam, furoresmide, losartan, will resume as pressures tolerate     #Seizure disorder  -Continue home Keppra and lamotrigine  -No seizure-like activity noted; will continue to monitor closely, consider EEG if mental status does not continue to improve with infection and polypharmacy management     #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 bladder and bowel  -Continue home bowel regimen  -Continue home oxycodone 5 mg q6h  -Home flexeril changed to tizanidine as per above     #T2DM  -A1c 6.3% 08/2024  -Holding home metformin  -Continue with SSI, qACHS POCT glucose checks, hypoglycemia protocol     #HTN  -Holding losartan, amlodipine, lasix iso soft pressures/right heart strain  -Will re-initiate as needed/tolerated     #Hx NSTEMI  #Hx CVA  -Continue home aspirin, atorvastatin, fenofibrate     #Gout  -Holding home allopurinol iso of KORTNEY     #Hx of MDD  -resume home Effexor      F: prn  E: prn  N: regular   GI ppx: PPI  DVT ppx: pLov     Bowel regimen: senna    Lines, Drains, Airways: pIV x2, Martinez catheter      Full Code   NOK/HPOA: wife Xbsrop-[phone]    Case seen and discussed with attending physician, Dr. Anaya.     Erlin Talbot   Acting Intern

## 2024-11-07 NOTE — CARE PLAN
Problem: Skin  Goal: Prevent/manage excess moisture  Outcome: Progressing     Problem: Skin  Goal: Prevent/minimize sheer/friction injuries  Outcome: Progressing  Flowsheets (Taken 11/7/2024 1201)  Prevent/minimize sheer/friction injuries:   Turn/reposition every 2 hours/use positioning/transfer devices   Use pull sheet     Problem: Pain - Adult  Goal: Verbalizes/displays adequate comfort level or baseline comfort level  Outcome: Progressing   The patient's goals for the shift include      The clinical goals for the shift include Patient pain will be monitored  and controlled    Over the shift, the patient did make progress toward goals met.      Subjective:       Patient ID: Riky Oseguera is a 26 y.o. female.    Chief Complaint: Back Pain    Patient says that last night picking up her daughter (40 pounds) felt a pop on the left side of the back. Pain starts at the neck and runs down her entire back. She was also helping a friend move yesterday and noticed some soreness in her neck but that was before the episode with her daughter. She has taken tylenol with little relief.   9/10, worse pain with movement, notices some tingling into the shoulder and leg on the ;eft side, no pain right side.       Past Medical History:  No date: Anxiety  No date: Depression  No date: Genital herpes  No date: HSV (herpes simplex virus) infection      Comment:  No active lesions.  Currently taking Valtrex  No date: Mental disorder      Comment:  anxiety    Past Surgical History:  No date: INDUCED   No date: NO PAST SURGERIES    Review of patient's family history indicates:  Problem: Alcohol abuse      Relation: Mother          Age of Onset: (Not Specified)  Problem: Alcohol abuse      Relation: Father          Age of Onset: (Not Specified)  Problem: Cancer      Relation: Maternal Grandmother          Age of Onset: (Not Specified)  Problem: Mental illness      Relation: Maternal Grandmother          Age of Onset: (Not Specified)  Problem: Cancer      Relation: Maternal Grandfather          Age of Onset: (Not Specified)      Social History    Socioeconomic History      Marital status:       Spouse name: Not on file      Number of children: Not on file      Years of education: Not on file      Highest education level: Not on file    Occupational History      Not on file    Social Needs      Financial resource strain: Not on file      Food insecurity        Worry: Not on file        Inability: Not on file      Transportation needs        Medical: Not on file        Non-medical: Not on file    Tobacco Use      Smoking status: Current Every Day Smoker         Packs/day: 0.50      Smokeless tobacco: Never Used    Substance and Sexual Activity      Alcohol use: Yes        Comment: ocasionally      Drug use: No      Sexual activity: Yes        Partners: Male    Lifestyle      Physical activity        Days per week: Not on file        Minutes per session: Not on file      Stress: Not on file    Relationships      Social connections        Talks on phone: Not on file        Gets together: Not on file        Attends [employer]ism service: Not on file        Active member of club or organization: Not on file        Attends meetings of clubs or organizations: Not on file        Relationship status: Not on file    Other Topics      Concerns:        Not on file    Social History Narrative      Not on file      Current Outpatient Medications:  clonazePAM (KLONOPIN) 1 MG tablet, Take 1 tablet (1 mg total) by mouth 2 (two) times daily as needed for Anxiety., Disp: 60 tablet, Rfl: 0  escitalopram oxalate (LEXAPRO) 20 MG tablet, Take 1 tablet (20 mg total) by mouth once daily., Disp: 30 tablet, Rfl: 3  traZODone (DESYREL) 50 MG tablet, Take 1 tablet (50 mg total) by mouth every evening., Disp: 30 tablet, Rfl: 0    No current facility-administered medications for this visit.       Review of patient's allergies indicates:   -- Sulfa (sulfonamide antibiotics) -- Hives    Review of Systems   Constitutional: Negative.  Negative for chills and fever.   Respiratory: Negative.    Cardiovascular: Negative.    Gastrointestinal: Negative.    Genitourinary: Negative.  Negative for bladder incontinence, dysuria and frequency.   Musculoskeletal: Positive for arthralgias, back pain, myalgias and neck pain.   Neurological: Positive for numbness.         Objective:      Physical Exam  HENT:      Head: Normocephalic and atraumatic.   Eyes:      Pupils: Pupils are equal, round, and reactive to light.   Cardiovascular:      Rate and Rhythm: Normal rate and regular rhythm.   Pulmonary:      Effort: Pulmonary  effort is normal.      Breath sounds: Normal breath sounds.   Musculoskeletal:      Cervical back: She exhibits decreased range of motion, tenderness and pain.      Thoracic back: She exhibits decreased range of motion, tenderness and pain.      Lumbar back: She exhibits decreased range of motion, tenderness and pain.        Back:    Psychiatric:         Mood and Affect: Mood normal.         Assessment:       1. Back strain, initial encounter        Plan:       1. Back strain, initial encounter  Follow up if not resolving. Immediately for worsening.   - ketorolac injection 30 mg  - ketorolac (TORADOL) 10 mg tablet; Take 1 tablet (10 mg total) by mouth 3 (three) times daily. for 4 days  Dispense: 12 tablet; Refill: 0  - cyclobenzaprine (FLEXERIL) 10 MG tablet; Take 1 tablet (10 mg total) by mouth 3 (three) times daily as needed for Muscle spasms.  Dispense: 30 tablet; Refill: 0

## 2024-11-07 NOTE — RESEARCH NOTES
Artificial Intelligence Monitoring in Nursing (AIMS Nursing) Study    Principle Investigator - Dr. Huber Hess  Research Coordinator - Sandi Santiago RN     Patient Name - Nino Crocker  Date - 11/7/2024 2:20 PM  Location - [address] was approached by Sandi Santiago RN to talk about participating in the AIMS Nursing Study. The patient was approached, they are expecting to be discharged today. Study protocol was followed and patient was given study contact information.     Sandi Santiago RN

## 2024-11-07 NOTE — CARE PLAN
The patient's goals for the shift include      The clinical goals for the shift include Patient will remain free.    Over the shift, the patient did not make progress toward the following goals. Barriers to progression include . Recommendations to address these barriers include .

## 2024-11-07 NOTE — PROGRESS NOTES
Physical Therapy                 Therapy Communication Note    Patient Name: Nino Crocker  MRN: [id number]  Department: [place] [address]  Room: [facility]-A  Today's Date: 11/7/2024     Discipline: Physical Therapy    Missed Visit Reason: Patient refused (Pt continues to refuse attempts for mobilty as he notes [facility] is [facility]. Will continue to follow while in house to promote activity as pt allows.)    Missed Time: Attempt

## 2024-11-07 NOTE — HOSPITAL COURSE
Nino Crocker is a 69 y.o. 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     He presented to the ED as Aox2. Temp 98.2F, , Resp 18, /69, SpO2 96% RA. On POCUS imaging done by ED physician, he was found to have preserved EF, dilated RV, +Stewart's sign, small/collapsible IVC. Heparin gtt was started empirically after high suspicion for PE given bedside POCUS, elevated trop, BNP, right heart strain on EKG. CT PE showed saddle PE and large plot burden. Formal Echo ordered. Due to lack of O2 requirement and hemodynamic stability, primary team did not feel thrombectomy or tPa is indicated at this time     [facility]: pH 5.5, 50 protein, 9 RBC, > 182 WBC, 500 LE, +nitrite, 4+ bacteria. He has a history of prior UCx 8/27/24: Klebsiella (R: ampicillin, S: CTX). His Martinez was changed on 10/29 per family.     Had remained hemodynamically stable on RA. Got 2L of NS in ED but otherwise has not needed pressors. Mentation still off. PESI score calculated was at 190 and would place him in intermediate high risk category. PERT call was done and patient was accepted to  CICU.     On admission to the floor, he was stable. His medical issues were addressed as follows:     Encephalopathy 2/2 UTI, polypharmacy: His mentation significantly improved throughout this stay. He was continued on ceftriaxone ([facility]ially start in [facility]) for UTI, and was switched to oral levofloxacin 10 day course, finished on 11/8. He was restarted on his home Effexor.     Extensive DVT/PE: DVT US showed extensive DVT throughout lower extremities. His heparin drip was transitioned to apixaban. He is to continue DOAC outpatient, follow  up vascular medicine.     He was continued on his home seizure medications while admitted.

## 2024-11-07 NOTE — PROGRESS NOTES
Nino Crocker is a 69 y.o. male on day 7 of admission presenting with Acute saddle pulmonary embolism, unspecified whether acute cor pulmonale present (Multi).    DOUG consulted on 11/4/24 to assist with transfer to [facility].     DOUG called by Sandeep, [facility] coordinator ([phone]), who stated he was still working on pt's transfer. He said he was waiting for an update from [facility] team.    Sandeep called DOUG back and informed her that pt has been accepted by [facility] team. They stated they could take pt tomorrow 11/7 morning. [facility] requested a Covid test to be done, SW informed med team.    DOUG informed med team of pt's acceptance.    DOUG met at bedside with pt and informed him that [facility] is willing to [facility] pt. Pt said he would like to return to [facility].    UPDATE: DOUG sent updated labs to Sandeep at [facility] via fax ([phone]). DOUG called [facility]eep at [facility] to inform him of the results and to be on the look out for the fax.  Pt transport set for 8 AM on 11/8 by the [facility].    DOUG will follow.     Pt adod: 11/6/24   Current dc plan: [facility]  Barrier: [facility] delay     Diana Phillips MSW LSW  Care Transitions  2  [phone] or Epic Secure Chat

## 2024-11-07 NOTE — PROGRESS NOTES
Occupational Therapy                 Therapy Communication Note    Patient Name: Nino Crocker  MRN: [id number]  Department: [place] [address]  Room: [address] [place] [address]  Today's Date: 11/7/2024     Discipline: Occupational Therapy    Missed Visit Reason: Missed Visit Reason:  (pending [facility] to [facility])    Missed Time: Attempt    Comment:

## 2024-11-07 NOTE — PROGRESS NOTES
Nino Crocker is a 69 y.o. male on day 6 of admission presenting with Acute saddle pulmonary embolism, unspecified whether acute cor pulmonale present (Multi).      Subjective   Patient did well, no acute overnight events. Denied chest pain, SOB.        Objective     Last Recorded Vitals  /65   Pulse 89   Temp 36.4 °C (97.5 °F)   Resp 16   Wt 119 kg (262 lb 5.6 oz)   SpO2 94%   Intake/Output last 3 Shifts:    Intake/Output Summary (Last 24 hours) at 11/6/2024 2126  Last data filed at 11/6/2024 1900  Gross per 24 hour   Intake 221 ml   Output 1100 ml   Net -879 ml       Admission Weight  Weight: 117 kg (257 lb 15 oz) (11/01/24 0000)    Daily Weight  11/05/24 : 119 kg (262 lb 5.6 oz)    Image Results  Vascular US Lower Extremity Venous Duplex Bilateral              [place] [address]    Tel [phone] and Fax [phone]       Vascular Lab Report  VASC US LOWER EXTREMITY VENOUS DUPLEX BILATERAL       Patient Name:      NINO CROCKER      Reading Physician:  [id number] Shonda Ernandez MD  Study Date:        11/4/2024            Ordering Physician: [id number] DAVIS  MRN/PID:           [id number]             Technologist:       Abilio Ramirez RVT  Accession#:        [id number]         Technologist 2:  Date of Birth/Age: 1955 / 69 years Encounter#:         [id number]  Gender:            M  Admission Status:  Inpatient            Location Performed: [facility]       Diagnosis/ICD: Other pulmonary embolism without acute cor pulmonale-I26.99  Indication:    Pulmonary Embolism  CPT Codes:     87227 Peripheral venous duplex scan for DVT complete       **CRITICAL RESULT**  Critical Result: Occlusive thrombus noted in bilateral legs as noted below.  Notification called to Agus Garner MD and Caitlin Ibarra MD on 11/4/2024 at 12:41:26 PM by Abilio Ramirez RVT.      CONCLUSIONS:  Right Lower Venous: There is acute occlusive deep vein thrombosis visualized in the proximal femoral, mid femoral, distal femoral and popliteal veins. Cannot rule out thrombus in non-visualized posterior tibial and Peroneal veins due to swelling. The remainder of the right leg is negative for deep vein thrombosis.  Left Lower Venous: There is acute occlusive deep vein thrombosis visualized in the common femoral, profunda, proximal femoral, mid femoral, distal femoral and popliteal veins. Cannot rule out thrombus in non-visualized posterior tibial and peroneal veins due to swelling. The remainder of the left leg is negative for deep vein thrombosis.     Imaging & Doppler Findings:     Right                 Compressible    Thrombus            Flow  Distal External Iliac                   None       Spontaneous/Phasic  CFV                       Yes           None       Spontaneous/Phasic  PFV                       Yes           None  FV Proximal                No      Acute occlusive        None  FV Mid                     No      Acute occlusive        None  FV Distal                  No      Acute occlusive        None  Popliteal                  No      Acute occlusive        None       Left                  Compress    Thrombus        Flow  Distal External Iliac               None       Continuous  CFV                      No    Acute occlusive    None  PFV                      No    Acute occlusive    None  FV Proximal              No    Acute occlusive    None  FV Mid                   No    Acute occlusive    None  FV Distal                No    Acute occlusive    None  Popliteal                No    Acute occlusive    None       [id number] Shonda Ernandez MD  Electronically signed by [id number] Shonda Ernandez MD on 11/4/2024 at 1:46:28 PM       ** Final **      Physical Exam  Constitutional:       Appearance: Normal appearance.   HENT:      Head: Normocephalic.      Mouth/Throat:       Mouth: Mucous membranes are moist.   Eyes:      Pupils: Pupils are equal, round, and reactive to light.   Cardiovascular:      Rate and Rhythm: Normal rate.   Pulmonary:      Effort: Pulmonary effort is normal.      Breath sounds: Normal breath sounds.   Abdominal:      General: Abdomen is flat.      Palpations: Abdomen is soft.   Musculoskeletal:         General: No swelling.      Cervical back: Normal range of motion.   Neurological:      Mental Status: He is alert and oriented to person, place, and time.      Comments: Paraplegia   Psychiatric:         Mood and Affect: Mood normal.         Relevant Results             Results for orders placed or performed during the hospital encounter of 10/31/24 (from the past 24 hours)   Magnesium   Result Value Ref Range    Magnesium 2.28 1.60 - 2.40 mg/dL   Comprehensive Metabolic Panel   Result Value Ref Range    Glucose 126 (H) 74 - 99 mg/dL    Sodium 134 (L) 136 - 145 mmol/L    Potassium 4.5 3.5 - 5.3 mmol/L    Chloride 100 98 - 107 mmol/L    Bicarbonate 27 21 - 32 mmol/L    Anion Gap 12 10 - 20 mmol/L    Urea Nitrogen 22 6 - 23 mg/dL    Creatinine 1.17 0.50 - 1.30 mg/dL    eGFR 67 >60 mL/min/1.73m*2    Calcium 9.6 8.6 - 10.6 mg/dL    Albumin 3.5 3.4 - 5.0 g/dL    Alkaline Phosphatase 83 33 - 136 U/L    Total Protein 7.1 6.4 - 8.2 g/dL    AST 19 9 - 39 U/L    Bilirubin, Total 0.4 0.0 - 1.2 mg/dL    ALT 6 (L) 10 - 52 U/L   Phosphorus   Result Value Ref Range    Phosphorus 3.2 2.5 - 4.9 mg/dL   CBC and Auto Differential   Result Value Ref Range    WBC 10.4 4.4 - 11.3 x10*3/uL    nRBC 0.0 0.0 - 0.0 /100 WBCs    RBC 3.89 (L) 4.50 - 5.90 x10*6/uL    Hemoglobin 11.4 (L) 13.5 - 17.5 g/dL    Hematocrit 36.9 (L) 41.0 - 52.0 %    MCV 95 80 - 100 fL    MCH 29.3 26.0 - 34.0 pg    MCHC 30.9 (L) 32.0 - 36.0 g/dL    RDW 14.6 (H) 11.5 - 14.5 %    Platelets 392 150 - 450 x10*3/uL    Neutrophils % 61.1 40.0 - 80.0 %    Immature Granulocytes %, Automated 1.0 (H) 0.0 - 0.9 %    Lymphocytes %  18.9 13.0 - 44.0 %    Monocytes % 11.6 2.0 - 10.0 %    Eosinophils % 6.4 0.0 - 6.0 %    Basophils % 1.0 0.0 - 2.0 %    Neutrophils Absolute 6.39 1.20 - 7.70 x10*3/uL    Immature Granulocytes Absolute, Automated 0.10 0.00 - 0.70 x10*3/uL    Lymphocytes Absolute 1.97 1.20 - 4.80 x10*3/uL    Monocytes Absolute 1.21 (H) 0.10 - 1.00 x10*3/uL    Eosinophils Absolute 0.67 0.00 - 0.70 x10*3/uL    Basophils Absolute 0.10 0.00 - 0.10 x10*3/uL   POCT GLUCOSE   Result Value Ref Range    POCT Glucose 132 (H) 74 - 99 mg/dL   POCT GLUCOSE   Result Value Ref Range    POCT Glucose 124 (H) 74 - 99 mg/dL   POCT GLUCOSE   Result Value Ref Range    POCT Glucose 96 74 - 99 mg/dL       Assessment/Plan      Nino Crocker is a 69 y.o. 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 Found to have bilateral saddle PE with +Stewart's sign in course of work-up. Transferred to  CICU for further management, ultimately not deemed a thrombectomy or tPA candidate in the setting of HDS and normal O2 saturation on RA. Transferred to floor on 11/2.     Patient has been hemodynamically stable, no changes in mental status. On antibiotic for UTI, and anticoagulation due to PE and DVT. KORTNEY resolved we will continue follow up daily while inpatient. [facility] from [facility] to define [facility], social work on board.      #Acute encephalopathy, suspected 2/2 UTI and polypharmacy- improving  -[facility] finalized 11/2, growing only mixed urogenital haseeb. Prior urine cx from 8/2024 grew Klebsiella susceptible to levofloxacin  Plan:   -Currently on levofloxacin 750 mg daily. Plan for total 10 day course through 11/8  -Resumed home oxycodone 5mg q6h prn, plan to transition home  flexeril to tizanidine at discharge  -Delirium precautions in place  -Possible discharge back to SNF. Per social work note, family interested in looking for a different facility (unhappy with care received at [facility])     #Pulmonary Embolism, Intermediate to High Risk  #Right heart strain  #Extensive DVTs   -No tachycardia or hypotension noted in CICU, however these were reported at the [facility] (SBP in 90s, HR in 110s), now improved with fluids  -FRANCISCO Stage II-III  -TTE at [facility] with preserved EF, + Stewart's sign  -b/l LE DVT US showed evidence of DVT in both LE.   Plan:   -Follow up with vascular medicine   -Continue apixaban  -Holding home anti-HTNs given likely preload dependence with known right heart strain     #KORTNEY, resolved  -Cr baseline 0.8 --> 1.5 -->1.1 -->1.04  -likely prerenal iso hypovolemia and poor PO intake vs use of nephrotoxic medications  -s/p 2L LR in ED at OSH  Plan:   -continue to monitor   -Holding home meloxicam, furoresmide, losartan, will resume as pressures tolerate     #Seizure disorder  -Continue home Keppra and lamotrigine  -No seizure-like activity noted; will continue to monitor closely, consider EEG if mental status does not continue to improve with infection and polypharmacy management     #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 bladder and bowel  -Continue home bowel regimen  -Continue home oxycodone 5 mg q6h  -Home flexeril changed to tizanidine as per above     #T2DM  -A1c 6.3% 08/2024  -Holding home metformin  -Continue with SSI, qACHS POCT glucose checks, hypoglycemia protocol     #HTN  -Holding losartan, amlodipine, lasix iso soft pressures/right heart strain  -Will re-initiate as needed/tolerated     #Hx NSTEMI  #Hx CVA  -Continue home aspirin, atorvastatin, fenofibrate     #Gout  -Holding home allopurinol iso of KORTNEY     #Hx of MDD  -resume home Effexor      F: prn  E: prn  N: regular   GI ppx: PPI  DVT ppx: pLov     Bowel regimen: senna   Lines,  Drains, Airways: pIV x2, Martinez catheter      Full Code   NOK/HPOA: wife [phone]    Case seen and discussed with attending physician, Dr. Anaya.     Erlin Talbot   Acting Intern

## 2024-11-08 VITALS
BODY MASS INDEX: 32.62 KG/M2 | DIASTOLIC BLOOD PRESSURE: 64 MMHG | RESPIRATION RATE: 16 BRPM | TEMPERATURE: 97.5 F | WEIGHT: 262.35 LBS | HEART RATE: 86 BPM | SYSTOLIC BLOOD PRESSURE: 120 MMHG | OXYGEN SATURATION: 97 % | HEIGHT: 75 IN

## 2024-11-08 LAB
ALBUMIN SERPL BCP-MCNC: 3.4 G/DL (ref 3.4–5)
ALP SERPL-CCNC: 78 U/L (ref 33–136)
ALT SERPL W P-5'-P-CCNC: 5 U/L (ref 10–52)
ANION GAP SERPL CALC-SCNC: 12 MMOL/L (ref 10–20)
AST SERPL W P-5'-P-CCNC: 14 U/L (ref 9–39)
BASOPHILS # BLD AUTO: 0.08 X10*3/UL (ref 0–0.1)
BASOPHILS NFR BLD AUTO: 1 %
BILIRUB SERPL-MCNC: 0.4 MG/DL (ref 0–1.2)
BUN SERPL-MCNC: 21 MG/DL (ref 6–23)
CALCIUM SERPL-MCNC: 9.6 MG/DL (ref 8.6–10.6)
CHLORIDE SERPL-SCNC: 99 MMOL/L (ref 98–107)
CO2 SERPL-SCNC: 27 MMOL/L (ref 21–32)
CREAT SERPL-MCNC: 1.13 MG/DL (ref 0.5–1.3)
EGFRCR SERPLBLD CKD-EPI 2021: 70 ML/MIN/1.73M*2
EOSINOPHIL # BLD AUTO: 0.44 X10*3/UL (ref 0–0.7)
EOSINOPHIL NFR BLD AUTO: 5.7 %
ERYTHROCYTE [DISTWIDTH] IN BLOOD BY AUTOMATED COUNT: 14.5 % (ref 11.5–14.5)
GLUCOSE BLD MANUAL STRIP-MCNC: 122 MG/DL (ref 74–99)
GLUCOSE SERPL-MCNC: 108 MG/DL (ref 74–99)
HCT VFR BLD AUTO: 34.6 % (ref 41–52)
HGB BLD-MCNC: 11.1 G/DL (ref 13.5–17.5)
IMM GRANULOCYTES # BLD AUTO: 0.1 X10*3/UL (ref 0–0.7)
IMM GRANULOCYTES NFR BLD AUTO: 1.3 % (ref 0–0.9)
LYMPHOCYTES # BLD AUTO: 2.03 X10*3/UL (ref 1.2–4.8)
LYMPHOCYTES NFR BLD AUTO: 26.4 %
MAGNESIUM SERPL-MCNC: 2.29 MG/DL (ref 1.6–2.4)
MCH RBC QN AUTO: 30.4 PG (ref 26–34)
MCHC RBC AUTO-ENTMCNC: 32.1 G/DL (ref 32–36)
MCV RBC AUTO: 95 FL (ref 80–100)
MONOCYTES # BLD AUTO: 0.88 X10*3/UL (ref 0.1–1)
MONOCYTES NFR BLD AUTO: 11.4 %
NEUTROPHILS # BLD AUTO: 4.16 X10*3/UL (ref 1.2–7.7)
NEUTROPHILS NFR BLD AUTO: 54.2 %
NRBC BLD-RTO: 0 /100 WBCS (ref 0–0)
PHOSPHATE SERPL-MCNC: 3.2 MG/DL (ref 2.5–4.9)
PLATELET # BLD AUTO: 405 X10*3/UL (ref 150–450)
POTASSIUM SERPL-SCNC: 4.2 MMOL/L (ref 3.5–5.3)
PROT SERPL-MCNC: 7 G/DL (ref 6.4–8.2)
RBC # BLD AUTO: 3.65 X10*6/UL (ref 4.5–5.9)
SODIUM SERPL-SCNC: 134 MMOL/L (ref 136–145)
WBC # BLD AUTO: 7.7 X10*3/UL (ref 4.4–11.3)

## 2024-11-08 PROCEDURE — 82947 ASSAY GLUCOSE BLOOD QUANT: CPT

## 2024-11-08 PROCEDURE — 36415 COLL VENOUS BLD VENIPUNCTURE: CPT

## 2024-11-08 PROCEDURE — 2500000001 HC RX 250 WO HCPCS SELF ADMINISTERED DRUGS (ALT 637 FOR MEDICARE OP)

## 2024-11-08 PROCEDURE — 99239 HOSP IP/OBS DSCHRG MGMT >30: CPT | Performed by: INTERNAL MEDICINE

## 2024-11-08 PROCEDURE — 80053 COMPREHEN METABOLIC PANEL: CPT

## 2024-11-08 PROCEDURE — 2500000005 HC RX 250 GENERAL PHARMACY W/O HCPCS

## 2024-11-08 PROCEDURE — 83735 ASSAY OF MAGNESIUM: CPT

## 2024-11-08 PROCEDURE — 84100 ASSAY OF PHOSPHORUS: CPT

## 2024-11-08 PROCEDURE — 85025 COMPLETE CBC W/AUTO DIFF WBC: CPT

## 2024-11-08 RX ADMIN — GABAPENTIN 400 MG: 300 CAPSULE ORAL at 08:02

## 2024-11-08 RX ADMIN — APIXABAN 10 MG: 5 TABLET, FILM COATED ORAL at 08:02

## 2024-11-08 RX ADMIN — LEVOFLOXACIN 750 MG: 750 TABLET, FILM COATED ORAL at 06:24

## 2024-11-08 RX ADMIN — LIDOCAINE 3 PATCH: 4 PATCH TOPICAL at 08:03

## 2024-11-08 RX ADMIN — Medication 1000 UNITS: at 08:01

## 2024-11-08 RX ADMIN — ALLOPURINOL 300 MG: 300 TABLET ORAL at 08:02

## 2024-11-08 RX ADMIN — ATORVASTATIN CALCIUM 40 MG: 40 TABLET, FILM COATED ORAL at 08:01

## 2024-11-08 RX ADMIN — LAMOTRIGINE 250 MG: 100 TABLET ORAL at 08:00

## 2024-11-08 RX ADMIN — FENOFIBRATE 54 MG: 54 TABLET, FILM COATED ORAL at 08:00

## 2024-11-08 RX ADMIN — PANTOPRAZOLE SODIUM 40 MG: 40 TABLET, DELAYED RELEASE ORAL at 06:24

## 2024-11-08 RX ADMIN — LEVETIRACETAM 1250 MG: 500 TABLET, FILM COATED ORAL at 08:02

## 2024-11-08 RX ADMIN — STANDARDIZED SENNA CONCENTRATE 17.2 MG: 8.6 TABLET ORAL at 08:01

## 2024-11-08 RX ADMIN — OXYCODONE HYDROCHLORIDE 5 MG: 5 TABLET ORAL at 08:32

## 2024-11-08 RX ADMIN — CYANOCOBALAMIN TAB 1000 MCG 1000 MCG: 1000 TAB at 08:00

## 2024-11-08 RX ADMIN — ASPIRIN 81 MG: 81 TABLET, COATED ORAL at 08:01

## 2024-11-08 RX ADMIN — VENLAFAXINE HYDROCHLORIDE 150 MG: 150 CAPSULE, EXTENDED RELEASE ORAL at 08:01

## 2024-11-08 ASSESSMENT — PAIN SCALES - GENERAL
PAINLEVEL_OUTOF10: 8
PAINLEVEL_OUTOF10: 0 - NO PAIN
PAINLEVEL_OUTOF10: 5 - MODERATE PAIN

## 2024-11-08 ASSESSMENT — PAIN - FUNCTIONAL ASSESSMENT
PAIN_FUNCTIONAL_ASSESSMENT: 0-10
PAIN_FUNCTIONAL_ASSESSMENT: 0-10

## 2024-11-08 NOTE — DISCHARGE INSTRUCTIONS
Hello,     You were admitted to the hospital because you were having some changes in your mental status. When you were in the ER, it was found that you had a blood clot in your lung, as well as a urinary tract infection. You were transferred to the Cardiovascular ICU at . Since you were not having issues with blood pressure, or needing more oxygen, you were classified as stable.     While you were at , [facility], which we switched to oral medication that you used last time when you had a UTI. Your mental status became significantly improved over time on the antibiotic. Also, you were placed on a blood thinner drip called heparin for your clot, which was switched to oral Eliquis so that you can continue on discharge.     For your instructions, it is important that you:   Continue to take this blood thinner medication Eliquis   Follow up with your primary care provider to discuss any changes to medications needed     Thank you for allow us to take part in your medical care,     [facility]

## 2024-11-08 NOTE — PROGRESS NOTES
Christina Crocker is a 69 y.o. male on hospital day 8 who is doing well this morning when I saw him.  Is without complaints    Objective     Exam     Vitals:    11/07/24 1126 11/07/24 1942 11/08/24 0035 11/08/24 0808   BP: 112/65 102/59 107/59 120/64   BP Location:   Left arm    Patient Position:   Lying    Pulse: 102 99 99 86   Resp: 15 14 16 16   Temp: 36.4 °C (97.5 °F) 36.6 °C (97.9 °F) 36.1 °C (97 °F) 36.4 °C (97.5 °F)   TempSrc:   Temporal    SpO2: 98% 95% 97% 97%   Weight:       Height:          Intake/Output last 3 shifts:  I/O last 3 completed shifts:  In: 1127 (9.5 mL/kg) [P.O.:1127]  Out: 2000 (16.8 mL/kg) [Urine:2000 (0.5 mL/kg/hr)]  Weight: 119 kg     Physical Exam  Vitals reviewed.   Constitutional:       General: He is not in acute distress.     Appearance: He is obese. He is not ill-appearing, toxic-appearing or diaphoretic.   HENT:      Head: Normocephalic and atraumatic.      Mouth/Throat:      Mouth: Mucous membranes are moist.   Cardiovascular:      Rate and Rhythm: Normal rate and regular rhythm.      Pulses: Normal pulses.      Heart sounds: No murmur heard.     No friction rub. No gallop.   Pulmonary:      Effort: Pulmonary effort is normal.      Breath sounds: Normal breath sounds. No wheezing, rhonchi or rales.      Comments: Anteriorly  Abdominal:      General: Bowel sounds are normal. There is no distension.      Palpations: Abdomen is soft.      Tenderness: There is no abdominal tenderness. There is no guarding or rebound.   Musculoskeletal:      Comments: 1+ bilateral lower extremity edema   Skin:     General: Skin is warm and dry.   Neurological:      Mental Status: He is alert and oriented to person, place, and time.      Comments: Paraplegic   Psychiatric:         Mood and Affect: Mood normal.         Behavior: Behavior normal.      Comments: Was very pleasant after I woke him up.            Medications               Labs     All new labs reviewed:  some of the basic labs  "as follows -     Results from last 7 days   Lab Units 11/08/24  0719 11/07/24 0755 11/06/24  0822   WBC AUTO x10*3/uL 7.7 9.0 10.4   HEMOGLOBIN g/dL 11.1* 11.6* 11.4*   HEMATOCRIT % 34.6* 37.7* 36.9*   PLATELETS AUTO x10*3/uL 405 414 392   NEUTROS PCT AUTO % 54.2 58.5 61.1   LYMPHS PCT AUTO % 26.4 21.5 18.9   MONOS PCT AUTO % 11.4 11.5 11.6   EOS PCT AUTO % 5.7 6.5 6.4          Results from last 72 hours   Lab Units 11/08/24  0719 11/07/24 0755 11/06/24  0822   SODIUM mmol/L 134* 133* 134*   POTASSIUM mmol/L 4.2 4.4 4.5   CHLORIDE mmol/L 99 100 100   CO2 mmol/L 27 22 27   BUN mg/dL 21 22 22   CREATININE mg/dL 1.13 1.21 1.17     Results from last 72 hours   Lab Units 11/08/24  0719 11/07/24 0755 11/06/24  0822   ALK PHOS U/L 78 80 83   AST U/L 14 17 19   ALT U/L 5* 4* 6*   BILIRUBIN TOTAL mg/dL 0.4 0.4 0.4   ALBUMIN g/dL 3.4 3.5 3.5   PROTEIN TOTAL g/dL 7.0 7.0 7.1     Results from last 72 hours   Lab Units 11/08/24 0719 11/07/24 0755 11/06/24  0822   GLUCOSE mg/dL 108* 101* 126*         No results found for: \"TR1\"  No results found for: \"URINECULTURE\", \"BLOODCULT\"         Imaging   Vascular US Lower Extremity Venous Duplex Bilateral              [place] [address]    Tel [phone] and Fax [phone]       Vascular Lab Report  VASC US LOWER EXTREMITY VENOUS DUPLEX BILATERAL       Patient Name:      JEANNA OTTO      Barby Physician:  [id number] Shonda Ernandez MD  Study Date:        11/4/2024            Ordering Physician: [id number] DAVIS  MRN/PID:           [id number]             Technologist:       Abilio ALEXANDERT  Accession#:        [id number]         Technologist 2:  Date of Birth/Age: 1955 / 69 years Encounter#:         [id number]  Gender:            M  Admission Status:  Inpatient            Location Performed: [facility]       Diagnosis/ICD: Other pulmonary " embolism without acute cor pulmonale-I26.99  Indication:    Pulmonary Embolism  CPT Codes:     27264 Peripheral venous duplex scan for DVT complete       **CRITICAL RESULT**  Critical Result: Occlusive thrombus noted in bilateral legs as noted below.  Notification called to Agus Garner MD and Caitlin Ibarra MD on 11/4/2024 at 12:41:26 PM by Abilio Ramirez [facility].     CONCLUSIONS:  Right Lower Venous: There is acute occlusive deep vein thrombosis visualized in the proximal femoral, mid femoral, distal femoral and popliteal veins. Cannot rule out thrombus in non-visualized posterior tibial and Peroneal veins due to swelling. The remainder of the right leg is negative for deep vein thrombosis.  Left Lower Venous: There is acute occlusive deep vein thrombosis visualized in the common femoral, profunda, proximal femoral, mid femoral, distal femoral and popliteal veins. Cannot rule out thrombus in non-visualized posterior tibial and peroneal veins due to swelling. The remainder of the left leg is negative for deep vein thrombosis.     Imaging & Doppler Findings:     Right                 Compressible    Thrombus            Flow  Distal External Iliac                   None       Spontaneous/Phasic  CFV                       Yes           None       Spontaneous/Phasic  PFV                       Yes           None  FV Proximal                No      Acute occlusive        None  FV Mid                     No      Acute occlusive        None  FV Distal                  No      Acute occlusive        None  Popliteal                  No      Acute occlusive        None       Left                  Compress    Thrombus        Flow  Distal External Iliac               None       Continuous  CFV                      No    Acute occlusive    None  PFV                      No    Acute occlusive    None  FV Proximal              No    Acute occlusive    None  FV Mid                   No    Acute occlusive    None  FV Distal                 No    Acute occlusive    None  Popliteal                No    Acute occlusive    None       [id number] Shonda Ernandez MD  Electronically signed by [id number] Shonda Ernandez MD on 11/4/2024 at 1:46:28 PM       ** Final **     No results found for this or any previous visit from the past 1095 days.     Encounter Date: 10/31/24   Electrocardiogram, 12-lead PRN ACS symptoms   Result Value    Ventricular Rate 98    Atrial Rate 98    OH Interval 174    QRS Duration 112    QT Interval 368    QTC Calculation(Bazett) 469    P Axis 36    R Axis 59    T Axis 89    QRS Count 16    Q Onset 223    P Onset 136    P Offset 197    T Offset 407    QTC Fredericia 433    Narrative    Normal sinus rhythm  Incomplete right bundle branch block  Cannot rule out Anteroseptal infarct , age undetermined  Abnormal ECG  No previous ECGs available          Assessment and Plan     Extensive DVTs with PE: Echo showed right heart strain.  Patient at risk given his immobility.  He noted his left leg initially started with the swelling a few weeks back.  It was not until later that the right leg became swollen.  Of note patient did have surgery however this was approximately 4 months ago.  Hemoglobin 11.1 from 11.6 and 11.4 the day before that.  Overall appears to be doing well unsuspected 12.3 patient had a few more days ago was a lab error.  No signs of blood loss  -Continue anticoagulation/DOAC.    -Follow-up vascular medicine    UTI: Urine culture mixed growth  -Complete planned levofloxacin    Psychiatry:  -Continue home Effexor    Acute renal failure: Resolved.  Creatinine remains roughly stable at approximately 1-1.2  -NTD    Cardiovascular: Right heart strain on echo from PE.  Blood pressure has been low to marginal with most recent recording at 97/62  -Holding vasoactive meds (losartan, amlodipine, Lasix)  -Continue aspirin, statin, fenofibrate    CNS: Seizure disorder and paraplegia  -Continue home Keppra and  lamotrigine  -Pain control with bowel care    Diabetes mellitus:  -Continue sliding scale insulin  -May resume metformin on discharge    Gout:  -Continue allopurinol    GI:  -Continue PPI  -Continue bowel care    DVT prophylaxis covered by DOAC    Physical therapy/Occupational Therapy     Patient and wife requested transfer back to the [facility].  Patient was discharged/transferred this morning where he will have disposition to rehab approved by [facility]    Kraig Anaya MD     Of note the above was done with Dragon dictation system.  Note was proofread to minimize errors.

## 2024-11-08 NOTE — NURSING NOTE
Discharge Note:    This RN went over discharge instructions with patient, patient verbalized understanding. IV taken out intact. Patient has all belongings including green blanket, small bag with notebook, green boot, toiletries, and jacket. Sacral dressing changed. All morning medications administered. VSS, BS stable. RN Henry Randhawa to call report. [facility] transported patient off unit via stretcher. Disposition is [facility].     Alis Lovelace RN

## 2024-11-08 NOTE — PROGRESS NOTES
11/08/24 0959   Discharge Planning   Does the patient need discharge transport arranged? Yes   RoundTrip coordination needed? Yes   [facility]   What day is the transport expected? 11/08/24   What time is the transport expected? 0800  (report: [phone] x [phone])     [facility] coordinator, Sandeep ([phone]), arranged transport for pt 11/8 at 8 am.    SW informed team and pt was updated.    SW will follow as needed.    Pt adod: 11/6/24  DC plan: [facility] med  Barrier: [facility] delay    Diana Phillips MSW LSW  Care Transitions  2  [phone] or Epic Secure Chat

## 2024-11-08 NOTE — DISCHARGE SUMMARY
Discharge Diagnosis  Acute saddle pulmonary embolism, unspecified whether acute cor pulmonale present (Multi)    Issues Requiring Follow-Up  [facility]     Discharge Meds     Medication List      START taking these medications     apixaban 5 mg tablet; Commonly known as: Eliquis; Take 2 tablets (10 mg)   by mouth 2 times a day for 7 days, THEN 1 tablet (5 mg) 2 times a day.;   Start taking on: November 7, 2024   tiZANidine 2 mg tablet; Commonly known as: Zanaflex; Take 1 tablet (2   mg) by mouth every 6 hours if needed for muscle spasms.     CONTINUE taking these medications     allopurinol 300 mg tablet; Commonly known as: Zyloprim   aspirin 81 mg EC tablet   atorvastatin 40 mg tablet; Commonly known as: Lipitor   bisacodyl 5 mg EC tablet; Commonly known as: Dulcolax   cholecalciferol 25 MCG (1000 UT) capsule; Commonly known as: Vitamin D-3   cyanocobalamin 1,000 mcg tablet; Commonly known as: Vitamin B-12   docusate sodium 100 mg capsule; Commonly known as: Colace   fenofibrate 48 mg tablet; Commonly known as: Tricor   gabapentin 600 mg tablet; Commonly known as: Neurontin   lamoTRIgine 25 mg tablet; Commonly known as: LaMICtal   levETIRAcetam 1,000 mg tablet; Commonly known as: Keppra   lidocaine 4 % patch   metFORMIN 500 mg tablet; Commonly known as: Glucophage   methocarbamol 500 mg tablet; Commonly known as: Robaxin   oxyCODONE 5 mg immediate release capsule; Commonly known as: Oxy-IR   pantoprazole 40 mg EC tablet; Commonly known as: ProtoNix   sennosides 8.6 mg tablet; Commonly known as: Senokot   venlafaxine  mg 24 hr capsule; Commonly known as: Effexor-XR     STOP taking these medications     amLODIPine 10 mg tablet; Commonly known as: Norvasc   cyclobenzaprine 10 mg tablet; Commonly known as: Flexeril   diclofenac sodium 1 % gel; Commonly known as: Voltaren   furosemide 20 mg tablet; Commonly known as: Lasix   lactobacillus  acidophilus & bulgar 1 million cell chewable tablet;   Commonly known as: Lactinex   losartan 50 mg tablet; Commonly known as: Cozaar   meloxicam 15 mg tablet; Commonly known as: Mobic       Test Results Pending At Discharge  Pending Labs       Order Current Status    CBC and Auto Differential Collected (11/08/24 0719)    Comprehensive Metabolic Panel Collected (11/08/24 0719)    Magnesium Collected (11/08/24 0719)    Phosphorus Collected (11/08/24 0719)            Hospital Course  Nino Crocker is a 69 y.o. year old male patient with PMHx significant for psoriatic arthritis (on infliximab), CVA (07/2024), HTN, seizure disorder, hx NSTEMI, and recent traumatic T6-T7 fracture (after a fall 07/2024) resulting in 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 bladder and bowel, [facility] with altered mental status.     He presented to the ED as Aox2. Temp 98.2F, , Resp 18, /69, SpO2 96% RA. On POCUS imaging done by ED physician, he was found to have preserved EF, dilated RV, +Stewart's sign, small/collapsible IVC. Heparin gtt was started empirically after high suspicion for PE given bedside POCUS, elevated trop, BNP, right heart strain on EKG. CT PE showed saddle PE and large plot burden. PESI score calculated was at 190 and would place him in intermediate high risk category. PERT call was done and patient was accepted to  CICU.  Due to lack of O2 requirement and hemodynamic stability, primary team did not feel thrombectomy or tPa is indicated at this time.  Vascular medicine was consulted and was following patient was ultimately discharged on DOAC/apixaban.  Of note patient's blood pressure did remain on the lower side so his vasoactive medications were still being held.    [facility]: pH 5.5, 50 protein, 9 RBC, > 182 WBC, 500 LE, +nitrite, 4+ bacteria. He has a history of prior UCx 8/27/24: Klebsiella (R: ampicillin, S: CTX). His Martinez was  changed on 10/29 per family.  His culture here had mixed growth.  He completed course of antibiotics utilizing levofloxacin.    Also had mild acute renal failure with creatinine peaking approximately 1.5 which improved prior to discharge after holding his diuretic, losartan, meloxicam and other vasoactive medications.    Patient did have episode of delirium.  Believed possibly related to his UTI plus minus other physiological stressors like PE in the ICU.  Wife was concerned that when he was off his Effexor as well and this was resumed.  Patient's mental status did return to normal with treatment of UTI/PE and resuming his Effexor.    Patient was doing well and ready for discharge.  Patient is a  who [facility] and per patient/patient's wife requests patient was [facility].      Pertinent Physical Exam At Time of Discharge  Physical Exam  Constitutional:       General: He is not in acute distress.  HENT:      Head: Normocephalic and atraumatic.   Eyes:      Pupils: Pupils are equal, round, and reactive to light.   Cardiovascular:      Rate and Rhythm: Normal rate and regular rhythm.      Heart sounds: No murmur heard.     No friction rub. No gallop.   Pulmonary:      Breath sounds: Normal breath sounds.   Abdominal:      General: Abdomen is flat. There is no distension.      Tenderness: There is no abdominal tenderness.   Musculoskeletal:      Cervical back: No rigidity or tenderness.      Right lower leg: No edema.      Left lower leg: No edema.   Neurological:      General: No focal deficit present.      Mental Status: He is alert and oriented to person, place, and time.       Outpatient Follow-Up  No future appointments.    I saw and evaluated the patient. I personally obtained the key and critical portions of the history and physical exam or was physically present for key and critical portions performed by the resident.  >30 minutes was spent  seeing and assessing patient, formulating plan as well as arranging for proper discharge with house staff residents      DIVINE YOUNG

## 2024-11-08 NOTE — CARE PLAN
Problem: Skin  Goal: Prevent/manage excess moisture  Outcome: Progressing     Problem: Skin  Goal: Prevent/minimize sheer/friction injuries  Outcome: Progressing  Flowsheets (Taken 11/8/2024 0903)  Prevent/minimize sheer/friction injuries:   Use pull sheet   Turn/reposition every 2 hours/use positioning/transfer devices   The patient's goals for the shift include      The clinical goals for the shift include Patient pain will be monitored  and controlled    Over the shift, the patient did not make progress toward the following goals. Barriers to progression include ***. Recommendations to address these barriers include ***.

## 2024-11-08 NOTE — PROGRESS NOTES
Transitional Care Coordination Progress Note:  Patient discussed during interdisciplinary rounds.  Team members present: MIA WU  Plan per Medical/Surgical team: Plan for discharge to [facility] today at 8am.  [facility]  Status: Inpatient  Discharge disposition: [facility]  Potential Barriers: none  ADOD: 11/8  See SW note 11/07 10:18 AM for further details regarding [facility] transfer. Care coordinator will continue to follow for discharge planning needs.     Adeel Washington RN  Transitional Care Coordinator (TCC)  [phone] or [phone]

## 2024-11-12 LAB
ATRIAL RATE: 98 BPM
P AXIS: 36 DEGREES
P OFFSET: 197 MS
P ONSET: 136 MS
PR INTERVAL: 174 MS
Q ONSET: 223 MS
QRS COUNT: 16 BEATS
QRS DURATION: 112 MS
QT INTERVAL: 368 MS
QTC CALCULATION(BAZETT): 469 MS
QTC FREDERICIA: 433 MS
R AXIS: 59 DEGREES
T AXIS: 89 DEGREES
T OFFSET: 407 MS
VENTRICULAR RATE: 98 BPM

## 2024-11-26 NOTE — DOCUMENTATION CLARIFICATION NOTE
"    PATIENT:               JEANNA OTTO  ACCT #:                  [id number]  MRN:                       [id number]  :                       1955  ADMIT DATE:       10/31/2024 7:01 PM  DISCH DATE:        2024 8:46 AM  RESPONDING PROVIDER #:        [id number]          PROVIDER RESPONSE TEXT:    Metabolic Encephalopathy 2/2 UTI    CDI QUERY TEXT:    Clarification      Instruction:    Based on your assessment of the patient and the clinical information, please provide the requested documentation by clicking on the appropriate radio button and enter any additional information if prompted.    Question: Please further clarify the type of Encephalopathy as    When answering this query, please exercise your independent professional judgment. The fact that a question is being asked, does not imply that any particular answer is desired or expected.    The patient's clinical indicators include:  Clinical Information: 69 yr old paraplegic transferred from [facility] for management of saddle PE on .   Initial presentation was for AMS thought secondary to UTI and possible polypharmacy.    Documented Diagnosis:  Noted throughout the chart (with exception of day of discharge)  \"Acute encephalopathy, suspected 2/2 UTI and polypharmacy\"   Per H&P  - has become progressively more confused  -is hard of hearing but has been unable to comprehend simple conversations and unable to recall events of the recent past  -reported visual hallucinations  -In ED was AO x2 (oriented to person/time, answered \"yes\" when asked if we were in a [place])  -\"Altered Mental Status - Suspect 2/2 UTI primarily, also potential polypharmacy iso KORTNEY (gabapentin, oxycodone,  methocarbamol, cyclobenzaprine)   significant event note  - Regarding his mental status, patient has 5-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  -several antispasmodics, gabapentin, and oxycodone which he takes however several days ago at nursing home medications " "may have been all  abruptly resumed after a hiatus  11/2 - \"remains altered this morning but is arousable,  some improvement in mentation/ability to participate in conversation\"  11/3 -\"much more lucid and alert\"  \"oriented x3 and reports feeling more lucid\"    Clinical Indicators and Documentation:  -Vital Signs: T 37.2, P 93,  /62 R 15 SpO2 96%  -Baseline Mental Status:  alert and oriented x3  -Underlying cause of change in mental status: suspected to be due to UTI and polypharmacy    Treatment:  initial holding  of cyclobenzapine  and oxycodone and is slowly tapering off gabapentin  replaced home flexeril with tizanidine  delirium precautions  1g ceftriaxone until 11/3 then changed to po levofloxacin for total 10 day course (10/30-11/8)    Risk Factors:  seizure disorder- no seizure activity noted  UTI  polypharmacy - gabapentin, oxycodone, methocarbamol, cyclobenzaprine  Options provided:  -- Metabolic Encephalopathy 2/2 UTI  -- Toxic Encephalopathy 2/2 polypharmacy  -- Multifactorial Encephalopathy, metabolic due to UTI and toxic due to polypharmacy  -- Other - I will add my own diagnosis  -- Refer to Clinical Documentation Reviewer    Query created by: Manjula Whyte on 11/26/2024 10:08 AM      Electronically signed by:  AIDE STRINGER MD 11/26/2024 11:41 AM          "